# Patient Record
Sex: FEMALE | Race: WHITE | Employment: OTHER | URBAN - METROPOLITAN AREA
[De-identification: names, ages, dates, MRNs, and addresses within clinical notes are randomized per-mention and may not be internally consistent; named-entity substitution may affect disease eponyms.]

---

## 2018-01-26 ENCOUNTER — HOSPITAL ENCOUNTER (OUTPATIENT)
Dept: WOMENS IMAGING | Age: 78
Discharge: HOME | End: 2018-01-26
Attending: INTERNAL MEDICINE | Admitting: INTERNAL MEDICINE

## 2018-01-26 DIAGNOSIS — Z12.31 VISIT FOR SCREENING MAMMOGRAM: ICD-10-CM

## 2019-02-21 ENCOUNTER — HOSPITAL ENCOUNTER (OUTPATIENT)
Dept: WOMENS IMAGING | Age: 79
Discharge: HOME | End: 2019-02-21
Payer: MEDICARE

## 2019-02-21 DIAGNOSIS — Z12.39 BREAST CANCER SCREENING: ICD-10-CM

## 2019-02-21 PROCEDURE — 77063 BREAST TOMOSYNTHESIS BI: CPT

## 2020-02-25 ENCOUNTER — HOSPITAL ENCOUNTER (OUTPATIENT)
Dept: WOMENS IMAGING | Age: 80
Discharge: HOME | End: 2020-02-25
Payer: MEDICARE

## 2020-02-25 PROCEDURE — 77063 BREAST TOMOSYNTHESIS BI: CPT

## 2020-11-13 ENCOUNTER — HOSPITAL ENCOUNTER (INPATIENT)
Age: 80
LOS: 5 days | Discharge: HOME | DRG: 872 | End: 2020-11-18
Attending: EMERGENCY MEDICINE | Admitting: INTERNAL MEDICINE
Payer: MEDICARE

## 2020-11-13 ENCOUNTER — APPOINTMENT (OUTPATIENT)
Dept: GENERAL RADIOLOGY | Age: 80
DRG: 872 | End: 2020-11-13
Payer: MEDICARE

## 2020-11-13 ENCOUNTER — APPOINTMENT (OUTPATIENT)
Dept: CT IMAGING | Age: 80
DRG: 872 | End: 2020-11-13
Payer: MEDICARE

## 2020-11-13 PROBLEM — N39.0 COMPLICATED UTI (URINARY TRACT INFECTION): Status: ACTIVE | Noted: 2020-11-13

## 2020-11-13 LAB
A/G RATIO: 1.1 (ref 1.1–2.2)
ALBUMIN SERPL-MCNC: 3.4 G/DL (ref 3.4–5)
ALP BLD-CCNC: 111 U/L (ref 40–129)
ALT SERPL-CCNC: 16 U/L (ref 10–40)
ANION GAP SERPL CALCULATED.3IONS-SCNC: 13 MMOL/L (ref 3–16)
AST SERPL-CCNC: 27 U/L (ref 15–37)
BACTERIA: ABNORMAL /HPF
BASOPHILS ABSOLUTE: 0 K/UL (ref 0–0.2)
BASOPHILS RELATIVE PERCENT: 0.1 %
BILIRUB SERPL-MCNC: 0.6 MG/DL (ref 0–1)
BILIRUBIN URINE: NEGATIVE
BLOOD, URINE: ABNORMAL
BUN BLDV-MCNC: 12 MG/DL (ref 7–20)
CALCIUM SERPL-MCNC: 8.2 MG/DL (ref 8.3–10.6)
CHLORIDE BLD-SCNC: 97 MMOL/L (ref 99–110)
CLARITY: ABNORMAL
CO2: 24 MMOL/L (ref 21–32)
COLOR: YELLOW
CREAT SERPL-MCNC: 0.6 MG/DL (ref 0.6–1.2)
EOSINOPHILS ABSOLUTE: 0 K/UL (ref 0–0.6)
EOSINOPHILS RELATIVE PERCENT: 0.1 %
EPITHELIAL CELLS, UA: 0 /HPF (ref 0–5)
GFR AFRICAN AMERICAN: >60
GFR NON-AFRICAN AMERICAN: >60
GLOBULIN: 3.2 G/DL
GLUCOSE BLD-MCNC: 418 MG/DL (ref 70–99)
GLUCOSE URINE: >=1000 MG/DL
HCT VFR BLD CALC: 34.6 % (ref 36–48)
HEMOGLOBIN: 11.6 G/DL (ref 12–16)
HYALINE CASTS: 1 /LPF (ref 0–8)
KETONES, URINE: 40 MG/DL
LACTIC ACID: 1.4 MMOL/L (ref 0.4–2)
LEUKOCYTE ESTERASE, URINE: ABNORMAL
LIPASE: 10 U/L (ref 13–60)
LYMPHOCYTES ABSOLUTE: 0.3 K/UL (ref 1–5.1)
LYMPHOCYTES RELATIVE PERCENT: 2.1 %
MAGNESIUM: 1.7 MG/DL (ref 1.8–2.4)
MCH RBC QN AUTO: 30.6 PG (ref 26–34)
MCHC RBC AUTO-ENTMCNC: 33.5 G/DL (ref 31–36)
MCV RBC AUTO: 91.3 FL (ref 80–100)
MICROSCOPIC EXAMINATION: YES
MONOCYTES ABSOLUTE: 0.8 K/UL (ref 0–1.3)
MONOCYTES RELATIVE PERCENT: 6.5 %
NEUTROPHILS ABSOLUTE: 11.2 K/UL (ref 1.7–7.7)
NEUTROPHILS RELATIVE PERCENT: 91.2 %
NITRITE, URINE: POSITIVE
PDW BLD-RTO: 13 % (ref 12.4–15.4)
PH UA: 5.5 (ref 5–8)
PLATELET # BLD: 185 K/UL (ref 135–450)
PMV BLD AUTO: 8.4 FL (ref 5–10.5)
POTASSIUM REFLEX MAGNESIUM: 3.3 MMOL/L (ref 3.5–5.1)
PROCALCITONIN: 2.42 NG/ML (ref 0–0.15)
PROTEIN UA: 30 MG/DL
RBC # BLD: 3.79 M/UL (ref 4–5.2)
RBC UA: 1 /HPF (ref 0–4)
REASON FOR REJECTION: NORMAL
REJECTED TEST: NORMAL
SARS-COV-2, NAAT: NOT DETECTED
SODIUM BLD-SCNC: 134 MMOL/L (ref 136–145)
SPECIFIC GRAVITY UA: 1.02 (ref 1–1.03)
TOTAL PROTEIN: 6.6 G/DL (ref 6.4–8.2)
TROPONIN: <0.01 NG/ML
URINE REFLEX TO CULTURE: YES
URINE TYPE: ABNORMAL
UROBILINOGEN, URINE: 0.2 E.U./DL
WBC # BLD: 12.2 K/UL (ref 4–11)
WBC UA: 97 /HPF (ref 0–5)

## 2020-11-13 PROCEDURE — 6360000002 HC RX W HCPCS: Performed by: NURSE PRACTITIONER

## 2020-11-13 PROCEDURE — 2580000003 HC RX 258: Performed by: NURSE PRACTITIONER

## 2020-11-13 PROCEDURE — 96365 THER/PROPH/DIAG IV INF INIT: CPT

## 2020-11-13 PROCEDURE — 71045 X-RAY EXAM CHEST 1 VIEW: CPT

## 2020-11-13 PROCEDURE — 87086 URINE CULTURE/COLONY COUNT: CPT

## 2020-11-13 PROCEDURE — 96375 TX/PRO/DX INJ NEW DRUG ADDON: CPT

## 2020-11-13 PROCEDURE — 87150 DNA/RNA AMPLIFIED PROBE: CPT

## 2020-11-13 PROCEDURE — 1200000000 HC SEMI PRIVATE

## 2020-11-13 PROCEDURE — 93005 ELECTROCARDIOGRAM TRACING: CPT | Performed by: NURSE PRACTITIONER

## 2020-11-13 PROCEDURE — 87088 URINE BACTERIA CULTURE: CPT

## 2020-11-13 PROCEDURE — 81001 URINALYSIS AUTO W/SCOPE: CPT

## 2020-11-13 PROCEDURE — 96361 HYDRATE IV INFUSION ADD-ON: CPT

## 2020-11-13 PROCEDURE — 83735 ASSAY OF MAGNESIUM: CPT

## 2020-11-13 PROCEDURE — 83690 ASSAY OF LIPASE: CPT

## 2020-11-13 PROCEDURE — 87040 BLOOD CULTURE FOR BACTERIA: CPT

## 2020-11-13 PROCEDURE — 84484 ASSAY OF TROPONIN QUANT: CPT

## 2020-11-13 PROCEDURE — 84145 PROCALCITONIN (PCT): CPT

## 2020-11-13 PROCEDURE — 87186 SC STD MICRODIL/AGAR DIL: CPT

## 2020-11-13 PROCEDURE — 83605 ASSAY OF LACTIC ACID: CPT

## 2020-11-13 PROCEDURE — U0002 COVID-19 LAB TEST NON-CDC: HCPCS

## 2020-11-13 PROCEDURE — 87077 CULTURE AEROBIC IDENTIFY: CPT

## 2020-11-13 PROCEDURE — 99285 EMERGENCY DEPT VISIT HI MDM: CPT

## 2020-11-13 PROCEDURE — 85025 COMPLETE CBC W/AUTO DIFF WBC: CPT

## 2020-11-13 PROCEDURE — 6370000000 HC RX 637 (ALT 250 FOR IP): Performed by: NURSE PRACTITIONER

## 2020-11-13 PROCEDURE — 6360000004 HC RX CONTRAST MEDICATION: Performed by: NURSE PRACTITIONER

## 2020-11-13 PROCEDURE — 74177 CT ABD & PELVIS W/CONTRAST: CPT

## 2020-11-13 PROCEDURE — 80053 COMPREHEN METABOLIC PANEL: CPT

## 2020-11-13 RX ORDER — ACETAMINOPHEN 325 MG/1
650 TABLET ORAL ONCE
Status: COMPLETED | OUTPATIENT
Start: 2020-11-13 | End: 2020-11-13

## 2020-11-13 RX ORDER — ONDANSETRON 2 MG/ML
4 INJECTION INTRAMUSCULAR; INTRAVENOUS ONCE
Status: COMPLETED | OUTPATIENT
Start: 2020-11-13 | End: 2020-11-13

## 2020-11-13 RX ORDER — 0.9 % SODIUM CHLORIDE 0.9 %
500 INTRAVENOUS SOLUTION INTRAVENOUS ONCE
Status: COMPLETED | OUTPATIENT
Start: 2020-11-13 | End: 2020-11-13

## 2020-11-13 RX ORDER — POTASSIUM CHLORIDE 7.45 MG/ML
10 INJECTION INTRAVENOUS ONCE
Status: COMPLETED | OUTPATIENT
Start: 2020-11-13 | End: 2020-11-14

## 2020-11-13 RX ORDER — MAGNESIUM SULFATE IN WATER 40 MG/ML
2 INJECTION, SOLUTION INTRAVENOUS ONCE
Status: COMPLETED | OUTPATIENT
Start: 2020-11-13 | End: 2020-11-13

## 2020-11-13 RX ADMIN — SODIUM CHLORIDE 500 ML: 9 INJECTION, SOLUTION INTRAVENOUS at 20:24

## 2020-11-13 RX ADMIN — ONDANSETRON 4 MG: 2 INJECTION INTRAMUSCULAR; INTRAVENOUS at 20:24

## 2020-11-13 RX ADMIN — MAGNESIUM SULFATE HEPTAHYDRATE 2 G: 40 INJECTION, SOLUTION INTRAVENOUS at 21:40

## 2020-11-13 RX ADMIN — CEFTRIAXONE 1 G: 1 INJECTION, POWDER, FOR SOLUTION INTRAMUSCULAR; INTRAVENOUS at 23:31

## 2020-11-13 RX ADMIN — ACETAMINOPHEN 650 MG: 325 TABLET ORAL at 20:25

## 2020-11-13 RX ADMIN — IOPAMIDOL 75 ML: 755 INJECTION, SOLUTION INTRAVENOUS at 20:48

## 2020-11-13 ASSESSMENT — ENCOUNTER SYMPTOMS
DIARRHEA: 0
COLOR CHANGE: 0
VOMITING: 1
SHORTNESS OF BREATH: 0
BLOOD IN STOOL: 0
ABDOMINAL PAIN: 1
COUGH: 0
CONSTIPATION: 0
BACK PAIN: 1
NAUSEA: 1

## 2020-11-13 ASSESSMENT — PAIN SCALES - GENERAL: PAINLEVEL_OUTOF10: 0

## 2020-11-14 LAB
BASOPHILS ABSOLUTE: 0 K/UL (ref 0–0.2)
BASOPHILS RELATIVE PERCENT: 0.1 %
EKG ATRIAL RATE: 90 BPM
EKG DIAGNOSIS: NORMAL
EKG P AXIS: -4 DEGREES
EKG P-R INTERVAL: 158 MS
EKG Q-T INTERVAL: 428 MS
EKG QRS DURATION: 98 MS
EKG QTC CALCULATION (BAZETT): 523 MS
EKG R AXIS: -40 DEGREES
EKG T AXIS: 43 DEGREES
EKG VENTRICULAR RATE: 90 BPM
EOSINOPHILS ABSOLUTE: 0 K/UL (ref 0–0.6)
EOSINOPHILS RELATIVE PERCENT: 0 %
HCT VFR BLD CALC: 37 % (ref 36–48)
HEMOGLOBIN: 12 G/DL (ref 12–16)
LYMPHOCYTES ABSOLUTE: 0.6 K/UL (ref 1–5.1)
LYMPHOCYTES RELATIVE PERCENT: 4.5 %
MCH RBC QN AUTO: 30 PG (ref 26–34)
MCHC RBC AUTO-ENTMCNC: 32.4 G/DL (ref 31–36)
MCV RBC AUTO: 92.7 FL (ref 80–100)
MONOCYTES ABSOLUTE: 0.6 K/UL (ref 0–1.3)
MONOCYTES RELATIVE PERCENT: 4.4 %
NEUTROPHILS ABSOLUTE: 13.1 K/UL (ref 1.7–7.7)
NEUTROPHILS RELATIVE PERCENT: 91 %
PDW BLD-RTO: 12.9 % (ref 12.4–15.4)
PLATELET # BLD: 209 K/UL (ref 135–450)
PMV BLD AUTO: 9 FL (ref 5–10.5)
RBC # BLD: 3.99 M/UL (ref 4–5.2)
REPORT: NORMAL
WBC # BLD: 14.3 K/UL (ref 4–11)

## 2020-11-14 PROCEDURE — 1200000000 HC SEMI PRIVATE

## 2020-11-14 PROCEDURE — 2580000003 HC RX 258: Performed by: INTERNAL MEDICINE

## 2020-11-14 PROCEDURE — 6360000002 HC RX W HCPCS: Performed by: INTERNAL MEDICINE

## 2020-11-14 PROCEDURE — 85025 COMPLETE CBC W/AUTO DIFF WBC: CPT

## 2020-11-14 PROCEDURE — 93010 ELECTROCARDIOGRAM REPORT: CPT | Performed by: INTERNAL MEDICINE

## 2020-11-14 PROCEDURE — 6360000002 HC RX W HCPCS: Performed by: NURSE PRACTITIONER

## 2020-11-14 PROCEDURE — 94760 N-INVAS EAR/PLS OXIMETRY 1: CPT

## 2020-11-14 PROCEDURE — 6370000000 HC RX 637 (ALT 250 FOR IP): Performed by: INTERNAL MEDICINE

## 2020-11-14 PROCEDURE — 36415 COLL VENOUS BLD VENIPUNCTURE: CPT

## 2020-11-14 RX ORDER — CALCIUM CARBONATE 500(1250)
950 TABLET ORAL DAILY
Status: DISCONTINUED | OUTPATIENT
Start: 2020-11-14 | End: 2020-11-18 | Stop reason: HOSPADM

## 2020-11-14 RX ORDER — METOPROLOL SUCCINATE 50 MG/1
100 TABLET, EXTENDED RELEASE ORAL 2 TIMES DAILY
Status: DISCONTINUED | OUTPATIENT
Start: 2020-11-14 | End: 2020-11-14

## 2020-11-14 RX ORDER — ACETAMINOPHEN 650 MG/1
650 SUPPOSITORY RECTAL EVERY 6 HOURS PRN
Status: DISCONTINUED | OUTPATIENT
Start: 2020-11-14 | End: 2020-11-18 | Stop reason: HOSPADM

## 2020-11-14 RX ORDER — LEVOTHYROXINE SODIUM 0.1 MG/1
100 TABLET ORAL DAILY
Status: DISCONTINUED | OUTPATIENT
Start: 2020-11-14 | End: 2020-11-18 | Stop reason: HOSPADM

## 2020-11-14 RX ORDER — SODIUM CHLORIDE 0.9 % (FLUSH) 0.9 %
10 SYRINGE (ML) INJECTION PRN
Status: DISCONTINUED | OUTPATIENT
Start: 2020-11-14 | End: 2020-11-18 | Stop reason: HOSPADM

## 2020-11-14 RX ORDER — METOPROLOL TARTRATE 100 MG/1
100 TABLET ORAL 2 TIMES DAILY
COMMUNITY

## 2020-11-14 RX ORDER — AMLODIPINE BESYLATE 5 MG/1
2.5 TABLET ORAL NIGHTLY
Status: DISCONTINUED | OUTPATIENT
Start: 2020-11-14 | End: 2020-11-14

## 2020-11-14 RX ORDER — PROMETHAZINE HYDROCHLORIDE 25 MG/1
12.5 TABLET ORAL EVERY 6 HOURS PRN
Status: DISCONTINUED | OUTPATIENT
Start: 2020-11-14 | End: 2020-11-18 | Stop reason: HOSPADM

## 2020-11-14 RX ORDER — CANDESARTAN CILEXETIL AND HYDROCHLOROTHIAZIDE 32; 12.5 MG/1; MG/1
1 TABLET ORAL DAILY
COMMUNITY

## 2020-11-14 RX ORDER — ONDANSETRON 2 MG/ML
4 INJECTION INTRAMUSCULAR; INTRAVENOUS EVERY 6 HOURS PRN
Status: DISCONTINUED | OUTPATIENT
Start: 2020-11-14 | End: 2020-11-18 | Stop reason: HOSPADM

## 2020-11-14 RX ORDER — ACETAMINOPHEN 325 MG/1
650 TABLET ORAL EVERY 6 HOURS PRN
Status: DISCONTINUED | OUTPATIENT
Start: 2020-11-14 | End: 2020-11-18 | Stop reason: HOSPADM

## 2020-11-14 RX ORDER — ATORVASTATIN CALCIUM 20 MG/1
20 TABLET, FILM COATED ORAL NIGHTLY
Status: DISCONTINUED | OUTPATIENT
Start: 2020-11-14 | End: 2020-11-18 | Stop reason: HOSPADM

## 2020-11-14 RX ORDER — METOPROLOL TARTRATE 50 MG/1
100 TABLET, FILM COATED ORAL 2 TIMES DAILY
Status: DISCONTINUED | OUTPATIENT
Start: 2020-11-14 | End: 2020-11-18 | Stop reason: HOSPADM

## 2020-11-14 RX ORDER — AMLODIPINE BESYLATE 10 MG/1
10 TABLET ORAL DAILY
COMMUNITY

## 2020-11-14 RX ORDER — SODIUM CHLORIDE, SODIUM LACTATE, POTASSIUM CHLORIDE, CALCIUM CHLORIDE 600; 310; 30; 20 MG/100ML; MG/100ML; MG/100ML; MG/100ML
INJECTION, SOLUTION INTRAVENOUS CONTINUOUS
Status: DISCONTINUED | OUTPATIENT
Start: 2020-11-14 | End: 2020-11-15

## 2020-11-14 RX ORDER — SODIUM CHLORIDE 0.9 % (FLUSH) 0.9 %
10 SYRINGE (ML) INJECTION EVERY 12 HOURS SCHEDULED
Status: DISCONTINUED | OUTPATIENT
Start: 2020-11-14 | End: 2020-11-18 | Stop reason: HOSPADM

## 2020-11-14 RX ORDER — ASPIRIN 81 MG/1
81 TABLET ORAL DAILY
Status: DISCONTINUED | OUTPATIENT
Start: 2020-11-14 | End: 2020-11-14

## 2020-11-14 RX ADMIN — LEVOTHYROXINE SODIUM 100 MCG: 0.1 TABLET ORAL at 05:33

## 2020-11-14 RX ADMIN — SODIUM CHLORIDE, POTASSIUM CHLORIDE, SODIUM LACTATE AND CALCIUM CHLORIDE: 600; 310; 30; 20 INJECTION, SOLUTION INTRAVENOUS at 02:17

## 2020-11-14 RX ADMIN — METOPROLOL TARTRATE 100 MG: 50 TABLET, FILM COATED ORAL at 21:23

## 2020-11-14 RX ADMIN — Medication 10 MEQ: at 00:30

## 2020-11-14 RX ADMIN — ENOXAPARIN SODIUM 40 MG: 40 INJECTION SUBCUTANEOUS at 09:48

## 2020-11-14 RX ADMIN — Medication 10 ML: at 19:36

## 2020-11-14 RX ADMIN — ACETAMINOPHEN 650 MG: 325 TABLET ORAL at 15:46

## 2020-11-14 RX ADMIN — CEFTRIAXONE 2 G: 2 INJECTION, POWDER, FOR SOLUTION INTRAMUSCULAR; INTRAVENOUS at 15:11

## 2020-11-14 RX ADMIN — CALCIUM 1000 MG: 500 TABLET ORAL at 09:48

## 2020-11-14 RX ADMIN — METOPROLOL TARTRATE 100 MG: 50 TABLET, FILM COATED ORAL at 09:48

## 2020-11-14 RX ADMIN — ATORVASTATIN CALCIUM 20 MG: 20 TABLET, FILM COATED ORAL at 21:23

## 2020-11-14 RX ADMIN — SODIUM CHLORIDE, POTASSIUM CHLORIDE, SODIUM LACTATE AND CALCIUM CHLORIDE: 600; 310; 30; 20 INJECTION, SOLUTION INTRAVENOUS at 14:03

## 2020-11-14 ASSESSMENT — PAIN SCALES - GENERAL
PAINLEVEL_OUTOF10: 0
PAINLEVEL_OUTOF10: 5

## 2020-11-14 ASSESSMENT — PAIN DESCRIPTION - DESCRIPTORS
DESCRIPTORS: ACHING
DESCRIPTORS: ACHING

## 2020-11-14 ASSESSMENT — PAIN DESCRIPTION - FREQUENCY
FREQUENCY: INTERMITTENT
FREQUENCY: INTERMITTENT

## 2020-11-14 ASSESSMENT — ENCOUNTER SYMPTOMS
SHORTNESS OF BREATH: 0
EYE ITCHING: 0
EYE DISCHARGE: 0
VOMITING: 1
COLOR CHANGE: 0
NAUSEA: 1
CHEST TIGHTNESS: 0
ABDOMINAL PAIN: 1

## 2020-11-14 ASSESSMENT — PAIN DESCRIPTION - ONSET
ONSET: PROGRESSIVE
ONSET: PROGRESSIVE

## 2020-11-14 ASSESSMENT — PAIN - FUNCTIONAL ASSESSMENT
PAIN_FUNCTIONAL_ASSESSMENT: PREVENTS OR INTERFERES SOME ACTIVE ACTIVITIES AND ADLS
PAIN_FUNCTIONAL_ASSESSMENT: PREVENTS OR INTERFERES SOME ACTIVE ACTIVITIES AND ADLS

## 2020-11-14 ASSESSMENT — PAIN DESCRIPTION - LOCATION
LOCATION: ABDOMEN
LOCATION: ABDOMEN

## 2020-11-14 ASSESSMENT — PAIN DESCRIPTION - PROGRESSION
CLINICAL_PROGRESSION: GRADUALLY WORSENING
CLINICAL_PROGRESSION: GRADUALLY IMPROVING

## 2020-11-14 ASSESSMENT — PAIN DESCRIPTION - PAIN TYPE
TYPE: ACUTE PAIN
TYPE: ACUTE PAIN

## 2020-11-14 ASSESSMENT — PAIN DESCRIPTION - ORIENTATION
ORIENTATION: MID;LOWER
ORIENTATION: MID;LOWER

## 2020-11-14 NOTE — ED NOTES
Donna Dillon, clinical supervisor notified regarding patient family concern about patient wandering around hospital at night d/t her confusion.  Per Amanda Dueñas, patient daughter, patient was allowed to have  at her bedside at her last visit d/t patient confusion and attempting to wander around hospital.      Aaron Pittman RN  11/13/20 8121

## 2020-11-14 NOTE — PROGRESS NOTES
Comprehensive Nutrition Assessment    Type and Reason for Visit:  Initial, Positive Nutrition Screen(diarrhea)    Nutrition Recommendations/Plan:   Add Ensure Enlive bid to start  Attempt to f/u with pt for interview    Nutrition Assessment:  Pt unavailable during screen, so information taken from medical record. Pt with pmh of mild dementia, HLD, HTN, new dx of cecum cancer, adm r/t 1 day of not feeling well & worsened mental status. Found to have complicated UTI. Noted no issues with diarrhea since adm. Diet adv to general. Noted per records that wt with no significant change, so po intake likely stable. With new dx of cancer, will add Ensure Enlive bid to start. Malnutrition Assessment:  Malnutrition Status:  Insufficient data    Context:  Acute Illness       Estimated Daily Nutrient Needs:  Energy (kcal):  7737-8205 (22-28 x ABW 63 kg); Weight Used for Energy Requirements:        Protein (g):  76 (1.2 x ABW); Weight Used for Protein Requirements:           Fluid (ml/day):  1 ml per kcal; Method Used for Fluid Requirements:         Nutrition Related Findings:  Noted no edema. Wounds:  None       Current Nutrition Therapies:    DIET GENERAL; Anthropometric Measures:  · Height: 5' (152.4 cm)  · Current Body Weight: 138 lb (62.6 kg)   · Admission Body Weight: 138 lb (62.6 kg)    · Ideal Body Weight: 100 lbs; % Ideal Body Weight 138 %   · BMI: 27  · Adjusted Body Weight:  ; No Adjustment   · BMI Categories: Overweight (BMI 25.0-29. 9)       Nutrition Diagnosis:   Predicted inadequate energy intake r/t catabolic illness AEB new dx of cancer    Nutrition Interventions:   Food and/or Nutrient Delivery:  Continue Current Diet, Start Oral Nutrition Supplement  Nutrition Education/Counseling:  No recommendation at this time   Coordination of Nutrition Care:  Continue to monitor while inpatient    Goals:  consume >/= to 50 %       Nutrition Monitoring and Evaluation:   Behavioral-Environmental Outcomes:  None Identified   Food/Nutrient Intake Outcomes:  Food and Nutrient Intake, Supplement Intake  Physical Signs/Symptoms Outcomes:  Biochemical Data, Constipation, Diarrhea, Nausea or Vomiting, Weight, Skin, Nutrition Focused Physical Findings, Fluid Status or Edema     Discharge Planning:     Too soon to determine     Electronically signed by Danice Ormond, RD, LD on 11/14/20 at 11:54 AM EST    Contact: [phone]

## 2020-11-14 NOTE — PROGRESS NOTES
Perfect serve to Dr Julianna Jenkins: please DC 81 mg aspirin. she does not take this at home.  refused to take this am. thanks

## 2020-11-14 NOTE — PROGRESS NOTES
Physician Progress Note      Darius Babcock  CSN #:                  [id number]  :                       1940  ADMIT DATE:       2020 6:49 PM  [address] [place] DATE:  RESPONDING  PROVIDER #:        Joshua Olson MD          QUERY TEXT:    Dear Dr Anand Ames,  Pt admitted with sepsis. Pt noted to have ams\"sundowning\". If possible, please   document in the progress notes and discharge summary if you are evaluating   and / or treating any of the following: The medical record reflects the following:  Risk Factors: age, uti, sepsis  Clinical Indicators: Documentation per ED of  Alert to person and place but   not year or president. Patient seems very confused when answering questions,   and cannot end up answering most of them. She will attempt to answer some   questions but seems very confused. Per h&p noted- Apparently, per the ER   documentation, the patient also had some underlying confusion, though at  this point in time, after treatment, the patient does not seem to be confused. Documentation per nursing of A&O and telly camera in place for sundowning   issues (according to family)  Treatment: monitor mental status, reorient to environment, telecamera    Thank you, Chioma Magana RN CDS CRCR  [email or web address]  [phone]  Options provided:  -- Septic encephalopathy  -- Delirium  -- Other - I will add my own diagnosis  -- Disagree - Not applicable / Not valid  -- Disagree - Clinically unable to determine / Unknown  -- Refer to Clinical Documentation Reviewer    PROVIDER RESPONSE TEXT:    Patient has delirium with unknown cause. Query created by:  Stew Magana on 2020 1:09 PM      Electronically signed by:  Joshua Olson MD 2020 4:14 PM

## 2020-11-14 NOTE — H&P
[address] [place] [address]                              HISTORY AND PHYSICAL    PATIENT NAME: Dorinda Rodas                  :        1940  MED REC NO:   [id number]                          ROOM:       3034  ACCOUNT NO:   [de-identified]                           ADMIT DATE: 2020  PROVIDER:     Macario Griffin MD    I obtained the history and performed the physical exam on the patient in  the Emergency Room on 2020. CHIEF COMPLAINT:  Fevers and chills. HISTORY OF PRESENT ILLNESS:  The patient is an 73-year-old   female who presented to the hospital with chief complaint of 1-day  history of sudden onset of gradually progressive increasing episodes of  what she describes as significant fevers, chills, fatigue, back pain  with some nausea, without vomiting, and just feeling progressively more  and more run down to a point where she was concerned, told her ,  and her  got her to the hospital because something was not right. No other constitutional symptoms. At the time of presentation, the  patient also started having some vomiting. Apparently, per the ER  documentation, the patient also had some underlying confusion, though at  this point in time, after treatment, the patient does not seem to be  confused. PAST MEDICAL/PAST SURGICAL HISTORY:  1. Hypothyroidism. 2.  Dyslipidemia. 3.  Hypertension. ALLERGIC HISTORY:  No known drug allergies. FAMILY HISTORY:  Reviewed by me and is currently noncontributory. SOCIAL HISTORY:  Nonsmoker. No illicit substance use. MEDICATIONS:  The patient's home medication list reviewed by me and  listed in the EMR. REVIEW OF SYSTEMS:  The patient's review of systems is significant for  the fevers and chills and per the history of present illness.   All other  systems have been reviewed and are negative except for the history of  present illness. PHYSICAL EXAMINATION:  The patient was examined by me in the Emergency  Room. VITAL SIGNS:  Temperature is T-max 102.4, respiratory rate 19, pulse 91,  blood pressure 133/66, saturating 92%. CNS:  Alert, awake, and oriented currently. PSYCH:  Cooperative, pleasant, answering questions appropriately. HEENT:  Eyes:  Pupils are reactive to light. ENT:  Extraocular muscle  movements are intact. RESPIRATORY:  No obvious rales or externally audible wheezes or rhonchi. CARDIOVASCULAR:  Nontachycardic. ABDOMEN:  Nondistended. The patient does have some vague abdominal  tenderness to palpation. MUSCULOSKELETAL:  No acute deformities. SKIN:  Without rashes or lesions. DIAGNOSTIC DATA:  The patient has a chest x-ray now showing no acute  cardiopulmonary disease. BUN 12, creatinine 0.6, sodium 134, potassium 3.3, magnesium 1.7. Procalcitonin 2.42. CBC showed a white count of 12.2 with a hemoglobin  of 11.6.  UA showed positive nitrite, moderate leukocyte esterase. COVID-19 testing negative. UA also showed 4+ bacteria, 97 white cells. CT of the abdomen and pelvis shows urothelial enhancement of bilateral  renal collecting systems and ureters. CONSULTATIONS CURRENTLY REQUESTED:  None. ASSESSMENT:  1. Complicated gram-negative bacterial urinary tract infection with  systemic inflammatory response syndrome. 2.  Hypertension. 3.  Hypothyroidism. 4.  Dyslipidemia. PLAN OF CARE:  The patient is admitted to Internal Medicine service. The patient will be continued on IV ceftriaxone. The patient's home  dose of Lipitor, Synthroid, and aspirin will be continued. The  patient's home dose of Toprol-XL will be continued as well. IV hydration will be initiated with lactated Ringer's. DVT prophylaxis with Lovenox. CODE STATUS:  Full. EXPECTED LENGTH OF STAY:  More than two midnights based on the plan of  care above.     RISK:  High due to the patient's presentation with the gram-negative  bacterial urinary tract infection with SIRS and early sepsis. DISPOSITION:  Admitted to Internal Medicine service.         Adilia Amor MD    D: 11/14/2020 5:39:45       T: 11/14/2020 6:45:15     [email or web address]  Job#: [id number]     Doc#: [id number]    CC:

## 2020-11-14 NOTE — PROGRESS NOTES
Pharmacy Medication Reconciliation Note     List of medications patient is currently taking is complete. Source of information:   1. Patient's  with medication list  2. EMR    Notes regarding home medications:   1. Patient's medication list clarified. Added amlodipine back and updated dose, removed losartan-HCTZ and added candesartan-HCTZ, metoprolol dose corrected to metoprolol tartrate BID. Patient is not taking aspirin at home.       [address] [facility] [place], Pharmacy Intern  11/14/2020 11:20 AM

## 2020-11-14 NOTE — ED NOTES
Report to [facility], [facility].       Osmany Schwartz RN  11/13/20 4147

## 2020-11-14 NOTE — PLAN OF CARE
Nutrition Problem #1: Predicted inadequate energy intake  Intervention: Food and/or Nutrient Delivery: Continue Current Diet, Start Oral Nutrition Supplement  Nutritional Goals: consume >/= to 50 %

## 2020-11-14 NOTE — PROGRESS NOTES
Progress Note    Admit Date: 11/13/2020         Subjective and Overnight Events: Pt being followed up for UTI  Feels better still weak and tired  No fever  No vomit       Objective:   Vitals: /62   Pulse 80   Temp 98.6 °F (37 °C) (Oral)   Resp 16   Ht 5' (1.524 m)   Wt 138 lb (62.6 kg)   SpO2 97%   BMI 26.95 kg/m²   /62   Pulse 80   Temp 98.6 °F (37 °C) (Oral)   Resp 16   Ht 5' (1.524 m)   Wt 138 lb (62.6 kg)   SpO2 97%   BMI 26.95 kg/m²     General Appearance:    Alert, cooperative, no distress, appears stated age   Head:    Normocephalic, without obvious abnormality, atraumatic   Eyes:    PERRL, conjunctiva/corneas clear       Ears:    Normal TM's and external ear canals, both ears   Nose:   Nares normal, septum midline, mucosa normal   Throat:   Lips, mucosa, and tongue normal; teeth and gums normal           Lungs:     Clear to auscultation bilaterally, respirations unlabored       Heart:    Regular rate and rhythm, S1 and S2 normal, no murmur, rub   or gallop   Abdomen:     Soft, non-tender, bowel sounds active all four quadrants,     no masses, no organomegaly           Extremities:   Extremities normal, atraumatic, no cyanosis or edema   Pulses:   2+ and symmetric all extremities   Skin:   Skin color, texture, turgor normal, no rashes or lesions       Neurologic:   CNII-XII intact.  Normal strength, sensation and reflexes       throughout         Pain is:Mild  Nausea:Mild  Bowel Movement/Flatus yes    Data:     Scheduled Medications:    aspirin  81 mg Oral Daily    calcium elemental  1,000 mg Oral Daily    levothyroxine  100 mcg Oral Daily    atorvastatin  20 mg Oral Nightly    sodium chloride flush  10 mL Intravenous 2 times per day    enoxaparin  40 mg Subcutaneous Daily    metoprolol tartrate  100 mg Oral BID    cefTRIAXone (ROCEPHIN) IV  2 g Intravenous Q24H      PRN Medications: sodium chloride flush, acetaminophen **OR** acetaminophen, promethazine **OR** ondansetron  Diet: DIET GENERAL;    Continuous Infusions:   lactated ringers 125 mL/hr at 11/14/20 0217         Intake/Output Summary (Last 24 hours) at 11/14/2020 0910  Last data filed at 11/14/2020 0454  Gross per 24 hour   Intake --   Output 120 ml   Net -120 ml       CBC:   Recent Labs     11/13/20  1950 11/14/20  0213   WBC 12.2* 14.3*   HGB 11.6* 12.0    209     BMP:  Recent Labs     11/13/20 1950   *   K 3.3*   CL 97*   CO2 24   BUN 12   CREATININE 0.6   GLUCOSE 418*     ABGs: No results found for: PHART, PO2ART, NHY8HAZ    Assessment/plan     Patient Active Problem List:     Incarcerated inguinal hernia     Diverticulitis     Complicated UTI (urinary tract infection)    - sepsis present on admission T + WBC + source - UTI  - Complicated gram-negative bacterial urinary tract infection with  systemic inflammatory response syndrome. - Hypertension.  - Hypothyroidism.  - Dyslipidemia.     Plan  BBx + for Ecoli + cefepime 2 grams   Continue hydration     Full Joyce Tristan MD

## 2020-11-14 NOTE — ED PROVIDER NOTES
[address] [facility]      Pt Name: Luis A Chiang  MRN: [id number]  [place] 1940  Date of evaluation: 11/13/2020  Provider: Romel Fernández MD    CHIEF COMPLAINT       Chief Complaint   Patient presents with    Emesis     pt brought to ED via EMS from home for sudden onset of n/v started tonight. HISTORY OF PRESENT ILLNESS    Luis A Chiang is a 78 y.o. female who presents to the emergency department with N/V/abdominal pain. Started today. + for confusion as well. Pain is 2/10 crampy in nature. Never happened before. No other associated symptoms. Nursing Notes were reviewed. Including nursing noted for FM, Surgical History, Past Medical History, Social History, vitals, and allergies; agree with all. REVIEW OF SYSTEMS       Review of Systems   Constitutional: Positive for activity change and fatigue. HENT: Negative for congestion and dental problem. Eyes: Negative for discharge and itching. Respiratory: Negative for chest tightness and shortness of breath. Gastrointestinal: Positive for abdominal pain, nausea and vomiting. Endocrine: Negative for cold intolerance and heat intolerance. Genitourinary: Positive for difficulty urinating. Negative for dysuria. Musculoskeletal: Positive for myalgias. Skin: Negative for color change and pallor. Neurological: Negative for dizziness and facial asymmetry. Hematological: Negative for adenopathy. Does not bruise/bleed easily. Psychiatric/Behavioral: Negative for agitation. Except as noted above the remainder of the review of systems was reviewed and negative.      PAST MEDICAL HISTORY     Past Medical History:   Diagnosis Date    Hyperlipidemia     Hypertension     Incarcerated hernia     Thyroid disease     hypothyroid    Wears partial dentures     upper       SURGICAL HISTORY       Past Surgical History:   Procedure Laterality Date    HARDWARE REMOVAL Right right leg    INGUINAL HERNIA REPAIR Left 1-22-14    Left incarcerated inguinal hernia repair with mesh and exparel     LEG SURGERY Right     alexis placement    SHOULDER SURGERY Left 2008    rotator cuff repair       CURRENT MEDICATIONS       Previous Medications    ALENDRONATE SODIUM (FOSAMAX PO)    Take 70 mg by mouth once a week. sundays    CHOLECALCIFEROL (VITAMIN D-3 PO)    Take 3,000 Units by mouth daily. Takes 3tabs of 1000iu daily to = 3000 units    LACTOBACILLUS RHAMNOSUS, GG, (CULTURELLE PO)    Take 1 capsule by mouth daily Take one capsule by mouth daily    LEVOTHYROXINE (SYNTHROID) 100 MCG TABLET    Take 100 mcg by mouth Daily. LOSARTAN-HYDROCHLOROTHIAZIDE (HYZAAR) 50-12.5 MG PER TABLET    Take 1 tablet by mouth daily. METOPROLOL (TOPROL-XL) 100 MG XL TABLET    Take 100 mg by mouth 2 times daily. SIMVASTATIN (ZOCOR) 40 MG TABLET    Take 40 mg by mouth nightly. ALLERGIES     Patient has no known allergies. FAMILY HISTORY      History reviewed. No pertinent family history.     SOCIAL HISTORY       Social History     Socioeconomic History    Marital status:      Spouse name: None    Number of children: None    Years of education: None    Highest education level: None   Occupational History    None   Social Needs    Financial resource strain: None    Food insecurity     Worry: None     Inability: None    Transportation needs     Medical: None     Non-medical: None   Tobacco Use    Smoking status: Never Smoker    Smokeless tobacco: Never Used   Substance and Sexual Activity    Alcohol use: Not Currently     Comment: rarely    Drug use: No    Sexual activity: None   Lifestyle    Physical activity     Days per week: None     Minutes per session: None    Stress: None   Relationships    Social connections     Talks on phone: None     Gets together: None     Attends Baptism service: None     Active member of club or organization: None     Attends meetings of clubs or organizations: None     Relationship status: None    Intimate partner violence     Fear of current or ex partner: None     Emotionally abused: None     Physically abused: None     Forced sexual activity: None   Other Topics Concern    None   Social History Narrative    None       PHYSICAL EXAM       ED Triage Vitals [11/13/20 1852]   BP Temp Temp Source Pulse Resp SpO2 Height Weight   133/66 102.4 °F (39.1 °C) Oral 91 19 92 % -- 138 lb 7.2 oz (62.8 kg)       Physical Exam  Vitals signs and nursing note reviewed. Constitutional:       General: She is not in acute distress. Appearance: She is well-developed. She is ill-appearing. She is not toxic-appearing or diaphoretic. HENT:      Head: Normocephalic and atraumatic. Right Ear: External ear normal.      Left Ear: External ear normal.      Mouth/Throat:      Mouth: Mucous membranes are dry. Eyes:      General:         Right eye: No discharge. Left eye: No discharge. Conjunctiva/sclera: Conjunctivae normal.      Pupils: Pupils are equal, round, and reactive to light. Neck:      Musculoskeletal: Normal range of motion and neck supple. Cardiovascular:      Rate and Rhythm: Normal rate and regular rhythm. Heart sounds: No murmur. Pulmonary:      Effort: Pulmonary effort is normal. No respiratory distress. Breath sounds: Normal breath sounds. No wheezing or rales. Abdominal:      General: Bowel sounds are normal. There is no distension. Palpations: Abdomen is soft. There is no mass. Tenderness: There is abdominal tenderness. There is no guarding or rebound. Genitourinary:     Comments: Deferred  Musculoskeletal: Normal range of motion. General: No deformity. Skin:     General: Skin is warm. Findings: No erythema or rash. Neurological:      Mental Status: She is alert. She is not disoriented. Cranial Nerves: No cranial nerve deficit. Sensory: No sensory deficit.       Motor: No atrophy or Ketones, Urine 40 (*)     Blood, Urine SMALL (*)     Protein, UA 30 (*)     Nitrite, Urine POSITIVE (*)     Leukocyte Esterase, Urine MODERATE (*)     All other components within normal limits    Narrative:     Performed at:  [facility]  [address] [facility] [place]   Phone [phone]   PROCALCITONIN - Abnormal; Notable for the following components:    Procalcitonin 2.42 (*)     All other components within normal limits    Narrative:     Lenny Morrisdilip tel. [phone],  Rejected Test Name/Called to: Anni Rodriguez, 11/13/2020 20:46, by [facility]  Performed at:  [facility]  [address] [facility] [address] [address]   Phone [phone]   MAGNESIUM - Abnormal; Notable for the following components:    Magnesium 1.70 (*)     All other components within normal limits    Narrative:     Performed at:  [address] [address]   Phone [phone]   CBC WITH AUTO DIFFERENTIAL - Abnormal; Notable for the following components:    WBC 12.2 (*)     RBC 3.79 (*)     Hemoglobin 11.6 (*)     Hematocrit 34.6 (*)     Neutrophils Absolute 11.2 (*)     Lymphocytes Absolute 0.3 (*)     All other components within normal limits    Narrative:     Lenny Modestooscar tel. [phone],  Rejected Test Name/Called to: Anni Rodriguez, 11/13/2020 20:46, by [facility]  Performed at:  [facility]  [address] [address] [address]   Phone [phone]   MICROSCOPIC URINALYSIS - Abnormal; Notable for the following components:    Bacteria, UA 4+ (*)     WBC, UA 97 (*)     All other components within normal limits    Narrative:     Performed at:  [address] [facility] [place]   Phone [phone]   CULTURE, BLOOD 1   CULTURE, BLOOD 2   CULTURE, URINE   TROPONIN    Narrative:     CALL  [facility] tel. [phone],  Rejected Test Name/Called to: Nicolette Kang, 11/13/2020 20:46, by [facility]  Performed at:  [facility]  [address] [facility] [place] [address]   Phone [phone]   LACTIC ACID, PLASMA    Narrative:     Rudi Cornelius tel. [phone],  Rejected Test Name/Called to: Nicolette Kang, 11/13/2020 20:46, by [facility]  Performed at:  [facility]  [address], [facility] [place] [address]   Phone [phone]    Narrative:     Rudi Cornelius tel. [phone],  Rejected Test Name/Called to: Nicolette Kang, 11/13/2020 20:46, by [facility]  Performed at:  [facility]  [address] [facility] [place] [address]   Phone [phone]    Narrative:     Performed at:  [facility]  [address] [facility] [address]   Phone [phone]   CBC WITH AUTO DIFFERENTIAL       All other labs were withinnormal range or not returned as of this dictation. EMERGENCY DEPARTMENT COURSE and DIFFERENTIAL DIAGNOSIS/MDM:     PMH, Surgical Hx, FH, Social Hx reviewed by myself (ETOH usage, Tobacco usage, Drug usage reviewed by myself, no pertinent Hx)- No Pertinent Hx     Old records were reviewed by me    Sepsis and UTI- Fluids and abx. Possible diverticulitis. Aaron repleted. Admission for further work up. Dr Walter Roa consulted and to admit. CRITICAL CARE TIME   Total Critical Caretime was 39 minutes, excluding separately reportable procedures. There was a high probability of clinically significant/life threatening deterioration in the patient's condition which required my urgent intervention. PROCEDURES:  Unlessotherwise noted below, none    FINAL IMPRESSION      1. Septicemia ([address]    2. Urinary tract infection without hematuria, site unspecified    3. Acute diverticulitis    4. Cecum mass    5.  Altered mental status, unspecified altered mental status type    6. Elevated procalcitonin    7. Non-intractable vomiting with nausea, unspecified vomiting type    8. Dehydration    9. Hypokalemia    10. Hypomagnesemia    11.  Hyperglycemia          DISPOSITION/PLAN   DISPOSITION Admitted 11/13/2020 10:35:22 PM    (Please note that portions ofthis note were completed with a voice recognition program.  Efforts were made to edit the dictations but occasionally words are mis-transcribed.)    Romel Fernández MD(electronically signed)  Attending Emergency Physician        Romel Fernández MD  11/14/20 8928

## 2020-11-14 NOTE — PROGRESS NOTES
Iv fluids infusing as ordered. Fall risk assessment completed. Fall precautions in place. Call light within reach. Pt educated on calling for assistance before getting up. Pt assessed for pain. Pt denies any pain at this time. Will continue to monitor pt and assess for pain throughout rest of shift. Walkway free of clutter. Will continue to monitor.    Electronically signed by Nandini Villa RN on 11/14/2020 at 5:52 AM

## 2020-11-14 NOTE — PROGRESS NOTES
Medication Reconciliation  Went bedside and spoke to patient and spouse about updated medications.     [address] Intern

## 2020-11-14 NOTE — PROGRESS NOTES
Patient in bed, she is alert and oriented x4 at this time. She has a pure wick in place. She denies pain, nausea, vomiting. Family at the bedside. She is eating and drinking without difficulty. Bed is to the floor, bed alarm set, telly camera in place for sundowning issues (according to family) Will monitor.

## 2020-11-14 NOTE — ED NOTES
Spoke with patient daughter, Malathi Saenz, and patient , Amara Perez, regarding patient confusion and events that occurred prior to patient coming to ED. Per patient daughter, patient has been having chronic diarrhea over the last few months, resulting in patient getting colonoscopy and biposy done by Dr. Daniel Del Valle on Wednesday 11/11. 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 CT performed yesterday 11/12 at [facility], and family was told that the cancer had not metastasized. Patient family states that tonight patient started complaining of back pain around 5 pm, and then had an episode of incontinence. Pt. Family states that patient  was cleaning patient up and put patient on toilet when he contacted Malathi Saenz, patient daughter. Patient daughter states that patient was unable to get off toilet, and was continuing to use restroom. Patient family states that patient was more disoriented than normal (baseline confusion is short term memory loss). Patient family states that they then called 911.  SHELBIE Trevino notified regarding new patient information      Beth Pfeiffer RN  11/13/20 1626

## 2020-11-14 NOTE — PLAN OF CARE
Problem: Falls - Risk of:  Goal: Will remain free from falls  Description: Will remain free from falls  Outcome: Met This Shift  Note: No falls noted this shift. Patient ambulates with x1 staff assist. Family member at bedside. Bed kept in low position. Safe environment maintained. Bedside table & call light in reach. Uses call light appropriately when needing assistance. Goal: Absence of physical injury  Description: Absence of physical injury  Outcome: Met This Shift  Note: No physical injury noted this shift. Patient has been assessed for fall risk, fall precautions are in place, environment has been cleared of obstacles and reassessed during rounding, bed is in lowest position with the wheels locked, call light is within reach. ID band has been verified, appropriate transfer methods have been utilized and patient has been educated on safety, alarms utilized      Problem: Nutrition  Goal: Optimal nutrition therapy  Outcome: Met This Shift  Note: Patient has been eating % of her meals, she has orders for supplements at lunch and dinner.

## 2020-11-14 NOTE — PROGRESS NOTES
H/p dictation id [id number]. Date of service 11/13/2020. Complicated uti. Sirs. Htn. Dyslipidemia. Hypothyroidism.

## 2020-11-14 NOTE — PROGRESS NOTES
Perfect serve to Dr. Nagi Aceves;  Blood Culture + for [facility] in the urine.  Thanks

## 2020-11-14 NOTE — ED PROVIDER NOTES
[address] [facility]        Pt Name: Elsie Cardona  MRN: [id number]  [place] 1940  Date of evaluation: 11/13/2020  Provider: PINKY Velez CNP  PCP: Bautista Lynn     I have seen and evaluated this patient with my supervising physician Marion Miller MD.    [address] [place] [address]       Chief Complaint   Patient presents with    Emesis     pt brought to ED via EMS from home for sudden onset of n/v started tonight. HISTORY OF PRESENT ILLNESS   (Location, Timing/Onset, Context/Setting, Quality, Duration, Modifying Factors, Severity, Associated Signs and Symptoms)  Note limiting factors. Elsie Cardona is a 78 y.o. female with PMH significant for HLD, HTN, thyroid disease, and incarcerated hernia who presents to the emergency department today complaining of abdominal pain with associated vomiting and fever. Onset was today. Patient is a very bad historian, and family is the primary historian. They advised the patient has mild dementia, but around 1700 hrs. this evening patient became much more disoriented. She was unable to make it to the bathroom and was complaining of back pain. When she finally did make it to the toilet, she was too weak to get off of the toilet. She was tested for Covid last Friday and was negative. They also report that patient had a colonoscopy on Wednesday by Dr. Lina Luis, and a CT scan of her abdomen yesterday which showed carcinoma of the cecum with no metastasis. Nursing Notes were all reviewed and agreed with or any disagreements were addressed in the HPI. REVIEW OF SYSTEMS    (2-9 systems for level 4, 10 or more for level 5)     Review of Systems   Constitutional: Positive for fatigue and fever. Negative for chills and diaphoresis. HENT: Negative. Eyes: Negative for visual disturbance. Respiratory: Negative for cough and shortness of breath.     Cardiovascular: Negative for chest pain. Gastrointestinal: Positive for abdominal pain, nausea and vomiting. Negative for blood in stool, constipation and diarrhea. Genitourinary: Negative for dysuria and hematuria. Musculoskeletal: Positive for back pain. Skin: Negative for color change and rash. Allergic/Immunologic: Negative for immunocompromised state. Neurological: Positive for weakness. Negative for dizziness, syncope and headaches. Psychiatric/Behavioral: Positive for confusion (dementia). All other systems reviewed and are negative. Positives and Pertinent negatives as per HPI. Except as noted above in the ROS, all other systems were reviewed and negative. PAST MEDICAL HISTORY     Past Medical History:   Diagnosis Date    Hyperlipidemia     Hypertension     Incarcerated hernia     Thyroid disease     hypothyroid    Wears partial dentures     upper         SURGICAL HISTORY     Past Surgical History:   Procedure Laterality Date    HARDWARE REMOVAL Right     right leg    INGUINAL HERNIA REPAIR Left 1-22-14    Left incarcerated inguinal hernia repair with mesh and exparel     LEG SURGERY Right     alexis placement    SHOULDER SURGERY Left 2008    rotator cuff repair         CURRENTMEDICATIONS       Previous Medications    ALENDRONATE SODIUM (FOSAMAX PO)    Take 70 mg by mouth once a week. sundays    AMLODIPINE (NORVASC) 2.5 MG TABLET    Take 2.5 mg by mouth nightly. ASPIRIN 81 MG TABLET    Take 81 mg by mouth daily. Stopped for surgery    CALCIUM CITRATE (CALCITRATE) 950 MG TABLET    Take 1 tablet by mouth daily. CHOLECALCIFEROL (VITAMIN D-3 PO)    Take 3,000 Units by mouth daily. Takes 3tabs of 1000iu daily to = 3000 units    FISH OIL    Take 1,200 mg by mouth daily. GLUCOSAMINE 500 MG CAPS    Take 1 tablet by mouth daily. LEVOTHYROXINE (SYNTHROID) 100 MCG TABLET    Take 100 mcg by mouth Daily. LOSARTAN-HYDROCHLOROTHIAZIDE (HYZAAR) 50-12.5 MG PER TABLET    Take 1 tablet by mouth daily. METOPROLOL (TOPROL-XL) 100 MG XL TABLET    Take 100 mg by mouth 2 times daily. MULTIPLE VITAMINS-MINERALS (MULTIVITAMIN PO)    Take 1 tablet by mouth daily. [employer]    SIMVASTATIN (ZOCOR) 40 MG TABLET    Take 40 mg by mouth nightly. ALLERGIES     Patient has no known allergies. FAMILYHISTORY     History reviewed. No pertinent family history. SOCIAL HISTORY       Social History     Tobacco Use    Smoking status: Never Smoker    Smokeless tobacco: Never Used   Substance Use Topics    Alcohol use: Not Currently     Comment: rarely    Drug use: No       SCREENINGS             PHYSICAL EXAM    (up to 7 for level 4, 8 or more for level 5)     ED Triage Vitals [11/13/20 1852]   BP Temp Temp Source Pulse Resp SpO2 Height Weight   133/66 102.4 °F (39.1 °C) Oral 91 19 92 % -- 138 lb 7.2 oz (62.8 kg)       Physical Exam  Vitals signs and nursing note reviewed. Constitutional:       General: She is not in acute distress. Appearance: Normal appearance. She is well-developed. She is ill-appearing. She is not toxic-appearing. HENT:      Head: Normocephalic and atraumatic. Mouth/Throat:      Mouth: Mucous membranes are dry. Eyes:      General: No scleral icterus. Extraocular Movements: Extraocular movements intact. Conjunctiva/sclera: Conjunctivae normal.   Neck:      Musculoskeletal: Full passive range of motion without pain and neck supple. No neck rigidity. Vascular: No JVD. Cardiovascular:      Rate and Rhythm: Normal rate and regular rhythm. Heart sounds: Normal heart sounds. Pulmonary:      Effort: Pulmonary effort is normal. No respiratory distress. Breath sounds: Normal breath sounds. Abdominal:      General: Bowel sounds are normal. There is no distension. Palpations: Abdomen is soft. Abdomen is not rigid. Tenderness: There is generalized abdominal tenderness. There is no right CVA tenderness, left CVA tenderness, guarding or rebound. Musculoskeletal: Normal range of motion. Skin:     General: Skin is warm and dry. Capillary Refill: Capillary refill takes less than 2 seconds. Findings: No rash. Neurological:      General: No focal deficit present. Mental Status: She is alert. Comments: Alert to person and place but not year or president. Patient seems very confused when answering questions, and cannot end up answering most of them.    Psychiatric:         Mood and Affect: Mood normal.         DIAGNOSTIC RESULTS   LABS:    Labs Reviewed   COMPREHENSIVE METABOLIC PANEL W/ REFLEX TO MG FOR LOW K - Abnormal; Notable for the following components:       Result Value    Sodium 134 (*)     Potassium reflex Magnesium 3.3 (*)     Chloride 97 (*)     Glucose 418 (*)     Calcium 8.2 (*)     All other components within normal limits    Narrative:     Performed at:  [address] [place] [address]   Phone [phone]   LIPASE - Abnormal; Notable for the following components:    Lipase 10.0 (*)     All other components within normal limits    Narrative:     Performed at:  [facility]  [address] [facility] [employer] [address]   Phone [phone]   URINE RT REFLEX TO CULTURE - Abnormal; Notable for the following components:    Clarity, UA CLOUDY (*)     Glucose, Ur >=1000 (*)     Ketones, Urine 40 (*)     Blood, Urine SMALL (*)     Protein, UA 30 (*)     Nitrite, Urine POSITIVE (*)     Leukocyte Esterase, Urine MODERATE (*)     All other components within normal limits    Narrative:     Performed at:  [facility]  [address] [facility] [employer] [address]   Phone [phone]   PROCALCITONIN - Abnormal; Notable for the following components:    Procalcitonin 2.42 (*)     All other components within normal limits    Narrative:     Ant Rhodes tel. [phone],  Rejected Test Name/Called to: Arabella Senior Yo Deal, 11/13/2020 20:46, by [facility]  Performed at:  [facility]  [address] [place] [address]   Phone [phone]    Narrative:     Performed at:  [facility]  [address] [facility] [place] [address]   Phone [phone]   COVID-19   CBC WITH AUTO DIFFERENTIAL       All other labs were within normal range or not returned as of this dictation. EKG: All EKG's are interpreted by the Emergency Department Physician in the absence of a cardiologist.  Please see their note for interpretation of EKG. RADIOLOGY:   Non-plain film images such as CT, Ultrasound and MRI are read by the radiologist. Plain radiographic images are visualized and preliminarily interpreted by the ED Provider with the below findings:        Interpretation per the Radiologist below, if available at the time of this note:    CT ABDOMEN PELVIS W IV CONTRAST Additional Contrast? None   Final Result   Urothelial enhancement of bilateral renal collecting systems and ureters,   raising question of  infection. Correlate with urinalysis. Mural thickening is also seen of the sigmoid colon in the setting of   diverticula, for which diverticulitis could be considered in the appropriate   clinical setting. 2.0 cm hepatic hypodensity near the gallbladder fossa, potentially focal   fatty infiltration of the liver or underlying hepatic lesion. As warranted,   in the nonacute setting, liver protocol CT or MR could be performed for   further assessment. Atherosclerosis, including coronary artery calcification. XR CHEST PORTABLE   Final Result   No acute cardiopulmonary disease. No results found.         PROCEDURES   Unless otherwise noted below, none     Procedures    CRITICAL CARE TIME   CRITICAL CARE NOTE:  There was a high probability of clinically significant life-threatening deterioration of the patient's condition requiring moving all extremities. Generalized abdominal TTP. Abdomen soft without rigidity or peritoneal signs. Lungs CTA bilaterally and cardiac exam is normal.  Urine shows nitrites, moderate leukocytes, 4+ bacteria, and 100 WBCs. Lactic acid normal, procalcitonin 2.4. Mild leukocytosis without bandemia. Hemoglobin 11.6. Potassium 3.3 and magnesium 1.7. Hyperglycemia at 418 with a normal anion gap and bicarb. Renal function and LFTs normal.  Lipase and troponin negative. Rapid Covid negative. CT abdomen pelvis shows possible diverticulitis, 2 cm hepatic hypodensity, and urothelial enhancement raising the question for pyelonephritis. CXR normal.    Emergency department course included Tylenol for fever, fluids for dehydration, Zofran for nausea, and electrolyte replacement. She likely has acute pyelonephritis with sepsis--treated with Rocephin. Pressures stable. Will be admitted for further work-up. Was given all test results and given an opportunity to ask and have any questions answered. She was agreeable to admission. The hospitalist was consulted and agreed to admit patient and write orders. The patient tolerated their visit well. They were seen and evaluated by the attending physician, Petty Campbell MD who agreed with the assessment and plan. The patient and / or the family were informed of the results of any tests, a time was given to answer questions, a plan was proposed and they agreed with plan. FINAL IMPRESSION      1. Septicemia ([address]    2. Urinary tract infection without hematuria, site unspecified    3. Acute diverticulitis    4. Cecum mass    5. Altered mental status, unspecified altered mental status type    6. Elevated procalcitonin    7. Non-intractable vomiting with nausea, unspecified vomiting type    8. Dehydration    9. Hypokalemia    10. Hypomagnesemia    11.  Hyperglycemia          DISPOSITION/PLAN   DISPOSITION Admitted 11/13/2020 10:35:22 PM      PATIENT REFERREDTO:  No follow-up provider specified.     DISCHARGE MEDICATIONS:  New Prescriptions    No medications on file       DISCONTINUED MEDICATIONS:  Discontinued Medications    No medications on file              (Please note that portions of this note were completed with a voice recognition program.  Efforts were made to edit the dictations but occasionally words are mis-transcribed.)    PINKY Knox CNP (electronically signed)           PINKY Knox CNP  11/13/20 4170

## 2020-11-14 NOTE — PROGRESS NOTES
Patient alert and oriented times three. Patient oriented to the room. Fall risk assessment completed. Fall precautions in place. Call light within reach. Pt educated on calling for assistance before getting up. Walkway free of clutter. Will continue to monitor. Telecamera in place. Bed alarm in place.  Electronically signed by Timothy Rendon RN on 11/14/2020 at 1:53 AM

## 2020-11-15 LAB
ANION GAP SERPL CALCULATED.3IONS-SCNC: 8 MMOL/L (ref 3–16)
BASOPHILS ABSOLUTE: 0 K/UL (ref 0–0.2)
BASOPHILS RELATIVE PERCENT: 0.3 %
BUN BLDV-MCNC: 12 MG/DL (ref 7–20)
CALCIUM SERPL-MCNC: 8.4 MG/DL (ref 8.3–10.6)
CHLORIDE BLD-SCNC: 95 MMOL/L (ref 99–110)
CO2: 30 MMOL/L (ref 21–32)
CREAT SERPL-MCNC: 0.7 MG/DL (ref 0.6–1.2)
EOSINOPHILS ABSOLUTE: 0.1 K/UL (ref 0–0.6)
EOSINOPHILS RELATIVE PERCENT: 0.7 %
GFR AFRICAN AMERICAN: >60
GFR NON-AFRICAN AMERICAN: >60
GLUCOSE BLD-MCNC: 286 MG/DL (ref 70–99)
HCT VFR BLD CALC: 34.3 % (ref 36–48)
HEMOGLOBIN: 11.4 G/DL (ref 12–16)
LYMPHOCYTES ABSOLUTE: 0.9 K/UL (ref 1–5.1)
LYMPHOCYTES RELATIVE PERCENT: 10.8 %
MAGNESIUM: 1.8 MG/DL (ref 1.8–2.4)
MCH RBC QN AUTO: 30.5 PG (ref 26–34)
MCHC RBC AUTO-ENTMCNC: 33.3 G/DL (ref 31–36)
MCV RBC AUTO: 91.8 FL (ref 80–100)
MONOCYTES ABSOLUTE: 0.8 K/UL (ref 0–1.3)
MONOCYTES RELATIVE PERCENT: 9.1 %
NEUTROPHILS ABSOLUTE: 6.7 K/UL (ref 1.7–7.7)
NEUTROPHILS RELATIVE PERCENT: 79.1 %
PDW BLD-RTO: 12.8 % (ref 12.4–15.4)
PLATELET # BLD: 173 K/UL (ref 135–450)
PMV BLD AUTO: 9.1 FL (ref 5–10.5)
POTASSIUM REFLEX MAGNESIUM: 3.4 MMOL/L (ref 3.5–5.1)
RBC # BLD: 3.73 M/UL (ref 4–5.2)
SODIUM BLD-SCNC: 133 MMOL/L (ref 136–145)
WBC # BLD: 8.4 K/UL (ref 4–11)

## 2020-11-15 PROCEDURE — 85025 COMPLETE CBC W/AUTO DIFF WBC: CPT

## 2020-11-15 PROCEDURE — 2580000003 HC RX 258: Performed by: INTERNAL MEDICINE

## 2020-11-15 PROCEDURE — 36415 COLL VENOUS BLD VENIPUNCTURE: CPT

## 2020-11-15 PROCEDURE — 80048 BASIC METABOLIC PNL TOTAL CA: CPT

## 2020-11-15 PROCEDURE — 6360000002 HC RX W HCPCS: Performed by: INTERNAL MEDICINE

## 2020-11-15 PROCEDURE — 1200000000 HC SEMI PRIVATE

## 2020-11-15 PROCEDURE — 6370000000 HC RX 637 (ALT 250 FOR IP): Performed by: INTERNAL MEDICINE

## 2020-11-15 PROCEDURE — 83735 ASSAY OF MAGNESIUM: CPT

## 2020-11-15 RX ORDER — POTASSIUM CHLORIDE 20 MEQ/1
40 TABLET, EXTENDED RELEASE ORAL ONCE
Status: COMPLETED | OUTPATIENT
Start: 2020-11-15 | End: 2020-11-15

## 2020-11-15 RX ADMIN — ATORVASTATIN CALCIUM 20 MG: 20 TABLET, FILM COATED ORAL at 21:10

## 2020-11-15 RX ADMIN — METOPROLOL TARTRATE 100 MG: 50 TABLET, FILM COATED ORAL at 21:10

## 2020-11-15 RX ADMIN — CEFTRIAXONE 2 G: 2 INJECTION, POWDER, FOR SOLUTION INTRAMUSCULAR; INTRAVENOUS at 14:19

## 2020-11-15 RX ADMIN — LEVOTHYROXINE SODIUM 100 MCG: 0.1 TABLET ORAL at 06:14

## 2020-11-15 RX ADMIN — ACETAMINOPHEN 650 MG: 325 TABLET ORAL at 19:15

## 2020-11-15 RX ADMIN — ACETAMINOPHEN 650 MG: 325 TABLET ORAL at 06:14

## 2020-11-15 RX ADMIN — POTASSIUM CHLORIDE 40 MEQ: 1500 TABLET, EXTENDED RELEASE ORAL at 09:37

## 2020-11-15 RX ADMIN — ENOXAPARIN SODIUM 40 MG: 40 INJECTION SUBCUTANEOUS at 09:38

## 2020-11-15 RX ADMIN — Medication 10 ML: at 21:10

## 2020-11-15 RX ADMIN — ACETAMINOPHEN 650 MG: 325 TABLET ORAL at 13:21

## 2020-11-15 RX ADMIN — SODIUM CHLORIDE, POTASSIUM CHLORIDE, SODIUM LACTATE AND CALCIUM CHLORIDE: 600; 310; 30; 20 INJECTION, SOLUTION INTRAVENOUS at 02:44

## 2020-11-15 RX ADMIN — CALCIUM 1000 MG: 500 TABLET ORAL at 09:37

## 2020-11-15 RX ADMIN — METOPROLOL TARTRATE 100 MG: 50 TABLET, FILM COATED ORAL at 09:37

## 2020-11-15 ASSESSMENT — PAIN DESCRIPTION - FREQUENCY
FREQUENCY: INTERMITTENT

## 2020-11-15 ASSESSMENT — PAIN - FUNCTIONAL ASSESSMENT
PAIN_FUNCTIONAL_ASSESSMENT: PREVENTS OR INTERFERES SOME ACTIVE ACTIVITIES AND ADLS

## 2020-11-15 ASSESSMENT — PAIN SCALES - GENERAL
PAINLEVEL_OUTOF10: 0
PAINLEVEL_OUTOF10: 4
PAINLEVEL_OUTOF10: 0
PAINLEVEL_OUTOF10: 4

## 2020-11-15 ASSESSMENT — PAIN DESCRIPTION - ORIENTATION
ORIENTATION: MID;LOWER

## 2020-11-15 ASSESSMENT — PAIN DESCRIPTION - LOCATION
LOCATION: ABDOMEN

## 2020-11-15 ASSESSMENT — PAIN DESCRIPTION - ONSET
ONSET: PROGRESSIVE

## 2020-11-15 ASSESSMENT — PAIN DESCRIPTION - DESCRIPTORS
DESCRIPTORS: ACHING

## 2020-11-15 ASSESSMENT — PAIN DESCRIPTION - PAIN TYPE
TYPE: ACUTE PAIN

## 2020-11-15 ASSESSMENT — PAIN DESCRIPTION - PROGRESSION
CLINICAL_PROGRESSION: GRADUALLY WORSENING
CLINICAL_PROGRESSION: GRADUALLY WORSENING
CLINICAL_PROGRESSION: GRADUALLY IMPROVING

## 2020-11-15 NOTE — PROGRESS NOTES
Patient in bed she is alert and oriented x3 not to time. Pure wick continues to be in place. She denies pain, nausea, vomiting. Fluids continue to infuse related to fever. She denies needs at this time and is on the phone with her . Call light in reach, bed alarm set, call light in reach. Will monitor.

## 2020-11-15 NOTE — PROGRESS NOTES
Progress Note    Admit Date: 11/13/2020         Subjective and Overnight Events: Pt being followed up for UTI  Feels better still weak and tired  No vomit  Good po  Fever overnight     Objective:   Vitals: /61   Pulse 69   Temp 101.6 °F (38.7 °C) (Oral)   Resp 16   Ht 5' (1.524 m)   Wt 141 lb 15.6 oz (64.4 kg)   SpO2 96%   BMI 27.73 kg/m²   /61   Pulse 69   Temp 101.6 °F (38.7 °C) (Oral)   Resp 16   Ht 5' (1.524 m)   Wt 141 lb 15.6 oz (64.4 kg)   SpO2 96%   BMI 27.73 kg/m²     General Appearance:    Alert, cooperative, no distress, appears stated age   Head:    Normocephalic, without obvious abnormality, atraumatic   Eyes:    PERRL, conjunctiva/corneas clear       Ears:    Normal TM's and external ear canals, both ears   Nose:   Nares normal, septum midline, mucosa normal   Throat:   Lips, mucosa, and tongue normal; teeth and gums normal           Lungs:     Clear to auscultation bilaterally, respirations unlabored       Heart:    Regular rate and rhythm, S1 and S2 normal, no murmur, rub   or gallop   Abdomen:     Soft, non-tender, bowel sounds active all four quadrants,     no masses, no organomegaly           Extremities:   Extremities normal, atraumatic, no cyanosis or edema   Pulses:   2+ and symmetric all extremities   Skin:   Skin color, texture, turgor normal, no rashes or lesions       Neurologic:   CNII-XII intact.  Normal strength, sensation and reflexes       throughout         Pain is:Mild  Nausea:Mild  Bowel Movement/Flatus yes    Data:     Scheduled Medications:    calcium elemental  1,000 mg Oral Daily    levothyroxine  100 mcg Oral Daily    atorvastatin  20 mg Oral Nightly    sodium chloride flush  10 mL Intravenous 2 times per day    enoxaparin  40 mg Subcutaneous Daily    metoprolol tartrate  100 mg Oral BID    cefTRIAXone (ROCEPHIN) IV  2 g Intravenous Q24H      PRN Medications: sodium chloride flush, acetaminophen **OR** acetaminophen, promethazine **OR** ondansetron  Diet: DIET GENERAL;  Dietary Nutrition Supplements: Standard High Calorie Oral Supplement    Continuous Infusions:   lactated ringers 75 mL/hr at 11/15/20 0244         Intake/Output Summary (Last 24 hours) at 11/15/2020 0740  Last data filed at 11/15/2020 0615  Gross per 24 hour   Intake 2439 ml   Output 1350 ml   Net 1089 ml       CBC:   Recent Labs     11/14/20  0213 11/15/20  0437   WBC 14.3* 8.4   HGB 12.0 11.4*    173     BMP:  Recent Labs     11/13/20  1950 11/15/20  0437   * 133*   K 3.3* 3.4*   CL 97* 95*   CO2 24 30   BUN 12 12   CREATININE 0.6 0.7   GLUCOSE 418* 286*     ABGs: No results found for: PHART, PO2ART, WBO4FEE    Assessment/plan     Patient Active Problem List:     Incarcerated inguinal hernia     Diverticulitis     Complicated UTI (urinary tract infection)    - sepsis present on admission T + WBC + source - UTI  - Complicated gram-negative bacterial urinary tract infection with  systemic inflammatory response syndrome. - Hypertension.  - Hypothyroidism.  - Dyslipidemia.     Plan  BBx + for Ecoli + cefepime 2 grams   Continue hydration  Replace lytes   DC in am if better     Full Tonia Kat MD

## 2020-11-15 NOTE — PROGRESS NOTES
Patient in bed, she putout 1100ml of urine this shift with the purewick. She has had a tempeture low grade most of the shift. Her vitals are stable. She became confused after a nap this shift and her  became very concerned. I evaluated the patient and she was back to her baseline in a few minutes. She reported pain to her lower, mid abdomen this shift and was given PRN Tylenol. Fluids have been discontinued per Dr Lizbeth Faith. Telly camera in the room,  at the bedside, call light in reach, bed alarm set. Will monitor.

## 2020-11-15 NOTE — PLAN OF CARE
Problem: Falls - Risk of:  Goal: Will remain free from falls  Description: Will remain free from falls  Outcome: Met This Shift  Note: No falls noted this shift. Patient is on bedrest and has not been up this shift. Bed kept in low position. Safe environment maintained. Bedside table & call light in reach. Uses call light appropriately when needing assistance. Goal: Absence of physical injury  Description: Absence of physical injury  Outcome: Met This Shift  Note: No physical injury noted this shift. Patient has been assessed for fall risk, fall precautions are in place, environment has been cleared of obstacles and reassessed during rounding, bed is in lowest position with the wheels locked, call light is within reach. ID band has been verified,  patient has been educated on safety. Problem: Nutrition  Goal: Optimal nutrition therapy  Outcome: Ongoing  Note: She reports she is not hungary and this has been going on for a long time.

## 2020-11-15 NOTE — PLAN OF CARE
Problem: Falls - Risk of:  Goal: Will remain free from falls  Description: Will remain free from falls  11/14/2020 2241 by Annabelle Connors RN  Outcome: Ongoing     Problem: Falls - Risk of:  Goal: Absence of physical injury  Description: Absence of physical injury  11/14/2020 2241 by Annabelle Connors RN  Outcome: Ongoing   Fall risk assessment completed every shift. All precautions in place. Pt has call light within reach at all times. Room clear of clutter. Pt unaware to call for assistance when getting up. Tele sitter in place for added safety precaution.     Problem: Nutrition  Goal: Optimal nutrition therapy  11/14/2020 2241 by Annabelle Connors RN  Outcome: Ongoing

## 2020-11-15 NOTE — PROGRESS NOTES
Pt AAO x4, short term memory loss noted. No c/o pain, nausea or vomiting. Assessment completed and charted. IV site in RFA infiltrated. 2+edema noted. Arm elevated on a pillow. New IV site started in L wrist.  IVF infusing without any difficulty. Tele sitter in place for added safety precaution. Denies any needs. Call light in reach. Will monitor.

## 2020-11-16 LAB
ANION GAP SERPL CALCULATED.3IONS-SCNC: 11 MMOL/L (ref 3–16)
BANDED NEUTROPHILS RELATIVE PERCENT: 1 % (ref 0–7)
BASOPHILS ABSOLUTE: 0 K/UL (ref 0–0.2)
BASOPHILS RELATIVE PERCENT: 0 %
BUN BLDV-MCNC: 9 MG/DL (ref 7–20)
CALCIUM SERPL-MCNC: 8.2 MG/DL (ref 8.3–10.6)
CHLORIDE BLD-SCNC: 95 MMOL/L (ref 99–110)
CO2: 22 MMOL/L (ref 21–32)
CREAT SERPL-MCNC: 0.5 MG/DL (ref 0.6–1.2)
CULTURE, BLOOD 2: ABNORMAL
CULTURE, BLOOD 2: ABNORMAL
EOSINOPHILS ABSOLUTE: 0.1 K/UL (ref 0–0.6)
EOSINOPHILS RELATIVE PERCENT: 1 %
GFR AFRICAN AMERICAN: >60
GFR NON-AFRICAN AMERICAN: >60
GLUCOSE BLD-MCNC: 276 MG/DL (ref 70–99)
HCT VFR BLD CALC: 34.7 % (ref 36–48)
HEMOGLOBIN: 11.7 G/DL (ref 12–16)
LYMPHOCYTES ABSOLUTE: 1.1 K/UL (ref 1–5.1)
LYMPHOCYTES RELATIVE PERCENT: 13 %
MCH RBC QN AUTO: 30.6 PG (ref 26–34)
MCHC RBC AUTO-ENTMCNC: 33.7 G/DL (ref 31–36)
MCV RBC AUTO: 90.8 FL (ref 80–100)
MONOCYTES ABSOLUTE: 0.7 K/UL (ref 0–1.3)
MONOCYTES RELATIVE PERCENT: 8 %
NEUTROPHILS ABSOLUTE: 6.9 K/UL (ref 1.7–7.7)
NEUTROPHILS RELATIVE PERCENT: 77 %
ORGANISM: ABNORMAL
PDW BLD-RTO: 12.5 % (ref 12.4–15.4)
PLATELET # BLD: 180 K/UL (ref 135–450)
PLATELET SLIDE REVIEW: ADEQUATE
PMV BLD AUTO: 9.3 FL (ref 5–10.5)
POTASSIUM REFLEX MAGNESIUM: 3.7 MMOL/L (ref 3.5–5.1)
RBC # BLD: 3.82 M/UL (ref 4–5.2)
RBC # BLD: NORMAL 10*6/UL
SLIDE REVIEW: ABNORMAL
SODIUM BLD-SCNC: 128 MMOL/L (ref 136–145)
SODIUM URINE: 84 MMOL/L
TSH SERPL DL<=0.05 MIU/L-ACNC: 10.64 UIU/ML (ref 0.27–4.2)
URINE CULTURE, ROUTINE: ABNORMAL
VACUOLATED NEUTROPHILS: PRESENT
WBC # BLD: 8.8 K/UL (ref 4–11)

## 2020-11-16 PROCEDURE — 94760 N-INVAS EAR/PLS OXIMETRY 1: CPT

## 2020-11-16 PROCEDURE — 2580000003 HC RX 258: Performed by: INTERNAL MEDICINE

## 2020-11-16 PROCEDURE — 97530 THERAPEUTIC ACTIVITIES: CPT

## 2020-11-16 PROCEDURE — 84300 ASSAY OF URINE SODIUM: CPT

## 2020-11-16 PROCEDURE — 6370000000 HC RX 637 (ALT 250 FOR IP): Performed by: INTERNAL MEDICINE

## 2020-11-16 PROCEDURE — 36415 COLL VENOUS BLD VENIPUNCTURE: CPT

## 2020-11-16 PROCEDURE — 97162 PT EVAL MOD COMPLEX 30 MIN: CPT

## 2020-11-16 PROCEDURE — 97535 SELF CARE MNGMENT TRAINING: CPT

## 2020-11-16 PROCEDURE — 85025 COMPLETE CBC W/AUTO DIFF WBC: CPT

## 2020-11-16 PROCEDURE — 87040 BLOOD CULTURE FOR BACTERIA: CPT

## 2020-11-16 PROCEDURE — 80048 BASIC METABOLIC PNL TOTAL CA: CPT

## 2020-11-16 PROCEDURE — 6360000002 HC RX W HCPCS: Performed by: INTERNAL MEDICINE

## 2020-11-16 PROCEDURE — 1200000000 HC SEMI PRIVATE

## 2020-11-16 PROCEDURE — 97116 GAIT TRAINING THERAPY: CPT

## 2020-11-16 PROCEDURE — 97165 OT EVAL LOW COMPLEX 30 MIN: CPT

## 2020-11-16 PROCEDURE — 84443 ASSAY THYROID STIM HORMONE: CPT

## 2020-11-16 RX ADMIN — METOPROLOL TARTRATE 100 MG: 50 TABLET, FILM COATED ORAL at 09:52

## 2020-11-16 RX ADMIN — ACETAMINOPHEN 650 MG: 325 TABLET ORAL at 14:26

## 2020-11-16 RX ADMIN — LEVOTHYROXINE SODIUM 100 MCG: 0.1 TABLET ORAL at 05:23

## 2020-11-16 RX ADMIN — CEFTRIAXONE 2 G: 2 INJECTION, POWDER, FOR SOLUTION INTRAMUSCULAR; INTRAVENOUS at 14:26

## 2020-11-16 RX ADMIN — Medication 10 ML: at 21:13

## 2020-11-16 RX ADMIN — CALCIUM 1000 MG: 500 TABLET ORAL at 09:52

## 2020-11-16 RX ADMIN — METOPROLOL TARTRATE 100 MG: 50 TABLET, FILM COATED ORAL at 21:13

## 2020-11-16 RX ADMIN — ENOXAPARIN SODIUM 40 MG: 40 INJECTION SUBCUTANEOUS at 09:52

## 2020-11-16 RX ADMIN — ATORVASTATIN CALCIUM 20 MG: 20 TABLET, FILM COATED ORAL at 21:13

## 2020-11-16 ASSESSMENT — PAIN SCALES - GENERAL
PAINLEVEL_OUTOF10: 3
PAINLEVEL_OUTOF10: 0

## 2020-11-16 NOTE — PROGRESS NOTES
Pt rested quietly overnight. Remained alert and oriented. Highest temp during the night was 99.5. Pt denies any needs. Will continue to monitor.

## 2020-11-16 NOTE — PROGRESS NOTES
Hospitalist Progress Note      PCP: Josefa De La Garza    Date of Admission: 11/13/2020    Subjective: spiked a fever with tmax 100.1F, denies any complaints    Medications:  Reviewed    Infusion Medications   Scheduled Medications    calcium elemental  1,000 mg Oral Daily    levothyroxine  100 mcg Oral Daily    atorvastatin  20 mg Oral Nightly    sodium chloride flush  10 mL Intravenous 2 times per day    enoxaparin  40 mg Subcutaneous Daily    metoprolol tartrate  100 mg Oral BID    cefTRIAXone (ROCEPHIN) IV  2 g Intravenous Q24H     PRN Meds: sodium chloride flush, acetaminophen **OR** acetaminophen, promethazine **OR** ondansetron      Intake/Output Summary (Last 24 hours) at 11/16/2020 1849  Last data filed at 11/16/2020 1559  Gross per 24 hour   Intake 180 ml   Output 2350 ml   Net -2170 ml       Physical Exam Performed:    /72   Pulse 75   Temp 100.2 °F (37.9 °C) (Oral)   Resp 14   Ht 5' (1.524 m)   Wt 141 lb 15.6 oz (64.4 kg)   SpO2 93%   BMI 27.73 kg/m²     General: Awake, oriented  HEENT: Pupils round, reacting to light  Heart: S1 S2 heard, no murmurs  Lung: Clear b/l  Abd: Soft, not distended, not tender, BS +  Extr: No cyanosis or clubbing  Neuro: No focal deficits. Labs:   Recent Labs     11/14/20  0213 11/15/20  0437 11/16/20  0517   WBC 14.3* 8.4 8.8   HGB 12.0 11.4* 11.7*   HCT 37.0 34.3* 34.7*    173 180     Recent Labs     11/13/20  1950 11/15/20  0437 11/16/20  0517   * 133* 128*   K 3.3* 3.4* 3.7   CL 97* 95* 95*   CO2 24 30 22   BUN 12 12 9   CREATININE 0.6 0.7 0.5*   CALCIUM 8.2* 8.4 8.2*     Recent Labs     11/13/20 1950   AST 27   ALT 16   BILITOT 0.6   ALKPHOS 111     No results for input(s): INR in the last 72 hours.   Recent Labs     11/13/20 1950   TROPONINI <0.01       Urinalysis:      Lab Results   Component Value Date    NITRU POSITIVE 11/13/2020    WBCUA 97 11/13/2020    BACTERIA 4+ 11/13/2020    RBCUA 1 11/13/2020    BLOODU SMALL 11/13/2020 SPECGRAV 1.025 11/13/2020    GLUCOSEU >=1000 11/13/2020       Radiology:  CT ABDOMEN PELVIS W IV CONTRAST Additional Contrast? None   Final Result   Addendum 1 of 1   ADDENDUM:   Report of outside CT dated 11/12/2020 has been subsequently obtained. Images   are not available for comparison. Cecal mass described on prior CT is not   well demonstrated on current CT as the cecum is decompressed and not   opacified. This could be further assessed with colonoscopy or short-term   interval follow-up exam with improved opacification of cecum. Final      XR CHEST PORTABLE   Final Result   No acute cardiopulmonary disease. Assessment/Plan:    Active Hospital Problems    Diagnosis    Complicated UTI (urinary tract infection) [N39.0]       - sepsis present on admission Tachy + WBC + source - UTI, pt still spiked a fever, monitor  - Complicated gram-negative bacterial urinary tract infection with e coli - cont IV abx  - bacteremia - blood cx positive for e coli, repeat blood cx sent  - Hypertension - controlled, cont home meds  - Hypothyroidism.  - Dyslipidemia.       Diet: DIET GENERAL;  Dietary Nutrition Supplements: Standard High Calorie Oral Supplement  Code Status: Full Code    PT/OT Eval Status: ordered    Dispo - poss dc in am on PO abx if blood cx neg and if afebrile    Yanet Hammer MD

## 2020-11-16 NOTE — PROGRESS NOTES
Pt AAO x4, can be forgetful. No c/o pain. Assessment completed and charted. Pt was just medicated for a temp of 100.1 at [address]. Will monitor. Tele sitter in room for added safety precaution. Denies needs at this time. Call light in reach. Will monitor.

## 2020-11-16 NOTE — PROGRESS NOTES
Luca Baum is a 78 y.o. female with PMH significant for HLD, HTN, thyroid disease, and incarcerated hernia who presents to the emergency department today complaining of abdominal pain with associated vomiting and fever. Onset was today. Patient is a very bad historian, and family is the primary historian. They advised the patient has mild dementia, but around 1700 hrs. this evening patient became much more disoriented. She was unable to make it to the bathroom and was complaining of back pain. When she finally did make it to the toilet, she was too weak to get off of the toilet. She was tested for Covid last Friday and was negative. They also report that patient had a colonoscopy on Wednesday by Dr. 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 (copied per Albertina Husain, APRN 11/13)  Exam: fx mobility, transfers, ADLs, cognition, UE ROM, strength  OT Education: OT Role;Plan of Care  REQUIRES OT FOLLOW UP: Yes  Activity Tolerance  Activity Tolerance: Patient Tolerated treatment well  Safety Devices  Safety Devices in place: Yes  Type of devices: Left in chair;Call light within reach; Chair alarm in place;Gait belt           Patient Diagnosis(es): The primary encounter diagnosis was Septicemia ([facility] [address] Diagnoses of Urinary tract infection without hematuria, site unspecified, Acute diverticulitis, Cecum mass, Altered mental status, unspecified altered mental status type, Elevated procalcitonin, Non-intractable vomiting with nausea, unspecified vomiting type, Dehydration, Hypokalemia, Hypomagnesemia, and Hyperglycemia were also pertinent to this visit. has a past medical history of Hyperlipidemia, Hypertension, Incarcerated hernia, Thyroid disease, and Wears partial dentures. has a past surgical history that includes shoulder surgery (Left, 2008); Leg Surgery (Right); Hardware Removal (Right); and Inguinal hernia repair (Left, 1-22-14). Restrictions  Restrictions/Precautions  Restrictions/Precautions: Fall Risk  Position Activity Restriction  Other position/activity restrictions: IV in L wrist    Subjective   General  Chart Reviewed: Yes, Orders, Progress Notes, History and Physical  Additional Pertinent Hx: Meredith Fatima is a 78 y.o. female with PMH significant for HLD, HTN, thyroid disease, and incarcerated hernia who presents to the emergency department today complaining of abdominal pain with associated vomiting and fever. Onset was today. Patient is a very bad historian, and family is the primary historian. They advised the patient has mild dementia, but around 1700 hrs. this evening patient became much more disoriented. She was unable to make it to the bathroom and was complaining of back pain. When she finally did make it to the toilet, she was too weak to get off of the toilet. She was tested for Covid last Friday and was negative. They also report that patient had a colonoscopy on Wednesday by Dr. 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  (copied per Cal Morales, APRN 11/13)  Family / Caregiver Present: Yes()  Referring Practitioner: Judith Mesa  Diagnosis: complicated UTI  Subjective  Subjective: Pt met bedside, agreeable to OT, no c/o of pain    Social/Functional History  Social/Functional History  Lives With: Spouse  Type of Home: (condo - second floor)  Home Access: Stairs to enter with rails  Entrance Stairs - Number of Steps: 16 BROWN  Entrance Stairs - Rails: Right  Bathroom Shower/Tub: Walk-in shower  Bathroom Toilet: Standard  Bathroom Accessibility: Walker accessible  ADL Assistance: Independent  Homemaking Assistance: Independent  Ambulation Assistance: Independent  Transfer Assistance: Independent  Active : Yes  Mode of Transportation: Car  Occupation: Retired  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  Cognition Comment: Pt able to problem solve through tasks, respond appropriately to questions, recall home set up, and demonstrated appropriate insight to current deficits. Pt  reports pt cognition declines when she has a fever. Sensation  Overall Sensation Status: WFL        LUE AROM (degrees)  LUE AROM : WFL  Left Hand AROM (degrees)  Left Hand AROM: WFL  RUE AROM (degrees)  RUE AROM : WFL  Right Hand AROM (degrees)  Right Hand AROM: WFL  LUE Strength  Gross LUE Strength: WFL  RUE Strength  Gross RUE Strength: WFL                   Plan   Plan  Times per week: 3-5  Plan weeks: by discharge  Current Treatment Recommendations: Strengthening, Patient/Caregiver Education & Training, Equipment Evaluation, Education, & procurement, Balance Training, Functional Mobility Training, Endurance Training, Safety Education & Training, Self-Care / ADL                                                    AM-PAC Score        AM-[facility] Inpatient Daily Activity Raw Score: 19 (11/16/20 1559)  -PAC Inpatient ADL T-Scale Score : 40.22 (11/16/20 1559)  ADL Inpatient CMS 0-100% Score: 42.8 (11/16/20 1559)  ADL Inpatient CMS G-Code Modifier : CK (11/16/20 1559)    Goals  Short term goals  Time Frame for Short term goals: by discharge  Short term goal 1: Pt will complete self care indep  Short term goal 2: Pt will complete fx mobility indep  Short term goal 3: Pt will complete transfers indep  Short term goal 4: Pt will increase activity tolerance by standing for >5 min to complete ADL task.   Patient Goals   Patient goals : \"to take care of myself and be able to make Zeen[employer] cookies at home\"       Therapy Time   Individual Concurrent Group Co-treatment   Time In 1500 Time Out 1600         Minutes 60         Timed Code Treatment Minutes: 45 Minutes(+15 min eval)     Therapist was present, directed the patient's care, made skilled judgement, and was responsible for assessment and treatment of the patient.         KIM Middleton OTR/NIDIA  #[id number] 11/16/20 4:18 PM

## 2020-11-16 NOTE — PROGRESS NOTES
Physical Therapy    Facility/Department: [address] ORTHOPEDICS  Initial Assessment  This note serves as patient discharge summary if pt discharges prior to next PT visit      NAME: Ne Resendiz  : 1940  MRN: [id number]    Date of Service: 2020    Discharge Recommendations:  24 hour supervision or assist, Home with assist PRN   Ne Resendiz scored a 19/24 on the AM-PAC short mobility form. PT Equipment Recommendations  Equipment Needed: No    Assessment   Body structures, Functions, Activity limitations: Decreased safe awareness;Decreased functional mobility   Assessment: Prior to hospital admit with septicemia and UTI, in setting of recently dx'd colon CA, patient lived in home with  and was independent with all functional mobility icluding ADLs, transfers, gait without device, driving. Status 2020: SBA for bed mobility, transfers, and gait 61' with multiple turns. Gait technique is good. Anticipate patient will be able to return to home as prior per medical OK. Chart notes patient with mild dementia, and telesittter in room. In light of this, would recommend at least frequent-24/7 supervision-assist for patient safety. Treatment Diagnosis: Impaired functional mobility  Prognosis: Good  Decision Making: Medium Complexity  History: as noted  Clinical Presentation: Evolving  PT Education: Goals;PT Role;Plan of Care  REQUIRES PT FOLLOW UP: Yes  Activity Tolerance  Activity Tolerance: Patient Tolerated treatment well       Patient Diagnosis(es): The primary encounter diagnosis was Septicemia ([facility] [address] Diagnoses of Urinary tract infection without hematuria, site unspecified, Acute diverticulitis, Cecum mass, Altered mental status, unspecified altered mental status type, Elevated procalcitonin, Non-intractable vomiting with nausea, unspecified vomiting type, Dehydration, Hypokalemia, Hypomagnesemia, and Hyperglycemia were also pertinent to this visit.      has a past medical history of Hyperlipidemia, Hypertension, Incarcerated hernia, Thyroid disease, and Wears partial dentures. has a past surgical history that includes shoulder surgery (Left, 2008); Leg Surgery (Right); Hardware Removal (Right); and Inguinal hernia repair (Left, 1-22-14). Restrictions  Restrictions/Precautions  Restrictions/Precautions: Fall Risk  Position Activity Restriction  Other position/activity restrictions: IV in L wrist  Vision/Hearing  Vision: Impaired  Vision Exceptions: Wears glasses at all times  Hearing: Within functional limits     Subjective  General  Chart Reviewed: Yes  Additional Pertinent Hx: Per ED note:  Naa Kaur is a 78 y.o. female with PMH significant for HLD, HTN, thyroid disease, and incarcerated hernia who presents to the emergency department today complaining of abdominal pain with associated vomiting and fever. Onset was today. Patient is a very bad historian, and family is the primary historian. They advised the patient has mild dementia, but around 1700 hrs. this evening patient became much more disoriented. She was unable to make it to the bathroom and was complaining of back pain. When she finally did make it to the toilet, she was too weak to get off of the toilet. She was tested for Covid last Friday and was negative. They also report that patient had a colonoscopy on Wednesday by Dr. 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 \"  Admitting dxs: Septicemia, UTI without hematuria. Response To Previous Treatment: Not applicable  Family / Caregiver Present: Yes()  Referring Practitioner: Krystal Patel MD  Referral Date : 11/16/20  Subjective  Subjective: Patient in bed. Awake. Agreeable to therapy. Denies pain.   Pain Screening  Patient Currently in Pain: Denies    Orientation  Orientation  Overall Orientation Status: Within Functional Limits     Social/Functional History  Social/Functional History  Lives With: Spouse  Type of Home: (condo - second floor)  Home Access: Stairs to enter with rails  Entrance Stairs - Number of Steps: 16 BROWN  Entrance Stairs - Rails: Right  Bathroom Shower/Tub: Walk-in shower  Bathroom Toilet: Standard  Bathroom Accessibility: Walker accessible  ADL Assistance: Independent  Homemaking Assistance: Independent  Ambulation Assistance: Independent  Transfer Assistance: Independent  Active : Yes  Mode of Transportation: Car  Occupation: Retired  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 Pt  reports pt cognition declines when she has a fever. Objective  AROM RLE (degrees)  RLE AROM: WNL  AROM LLE (degrees)  LLE AROM : WNL  Strength RLE  Strength RLE: WNL  Comment: symmetrical  Strength LLE  Strength LLE: WNL  Comment: symmetrical  Sensation  Overall Sensation Status: WNL  Bed mobility  Supine to Sit: Stand by assistance(HOB up, 1 rail. Exiting to L side.)  Transfers  Sit to Stand: Stand by assistance(visitor chair, EOB)  Stand to sit: Stand by assistance(Visitor chair.)  Ambulation  Ambulation?: Yes  Ambulation 1  Surface: level tile  Device: No Device  Assistance: Stand by assistance  Quality of Gait: controlled, step through pattern. Trace deviated gait path, without loss of balance. Gait Deviations: None  Distance: 60', multiple turns. Stairs/Curb  Stairs?: No  Gait Deviations  Gait Deviations: None  Balance  Posture: Good  Sitting - Static: Good  Sitting - Dynamic: Good  Standing - Static: Good  Standing - Dynamic: Good  Comments: Patient sits at EOB and dons footies.       Plan   Plan  Times per week: 1-3 visits as indicaed  Current Treatment Recommendations: Functional Mobility Training, Transfer

## 2020-11-16 NOTE — PROGRESS NOTES
Pt. Awake purewick in place for urine collection pt has brief on that is dry and intact . Pt.'s  is at the bedside . They have no complaints at this time.

## 2020-11-17 LAB
ANION GAP SERPL CALCULATED.3IONS-SCNC: 9 MMOL/L (ref 3–16)
BASOPHILS ABSOLUTE: 0 K/UL (ref 0–0.2)
BASOPHILS RELATIVE PERCENT: 0.5 %
BLOOD CULTURE, ROUTINE: NORMAL
BUN BLDV-MCNC: 11 MG/DL (ref 7–20)
CALCIUM SERPL-MCNC: 8.8 MG/DL (ref 8.3–10.6)
CHLORIDE BLD-SCNC: 98 MMOL/L (ref 99–110)
CO2: 29 MMOL/L (ref 21–32)
CREAT SERPL-MCNC: 0.6 MG/DL (ref 0.6–1.2)
EOSINOPHILS ABSOLUTE: 0.1 K/UL (ref 0–0.6)
EOSINOPHILS RELATIVE PERCENT: 2.2 %
GFR AFRICAN AMERICAN: >60
GFR NON-AFRICAN AMERICAN: >60
GLUCOSE BLD-MCNC: 275 MG/DL (ref 70–99)
HCT VFR BLD CALC: 33.4 % (ref 36–48)
HEMOGLOBIN: 11.3 G/DL (ref 12–16)
LYMPHOCYTES ABSOLUTE: 1.3 K/UL (ref 1–5.1)
LYMPHOCYTES RELATIVE PERCENT: 22.2 %
MCH RBC QN AUTO: 30.2 PG (ref 26–34)
MCHC RBC AUTO-ENTMCNC: 33.8 G/DL (ref 31–36)
MCV RBC AUTO: 89.4 FL (ref 80–100)
MONOCYTES ABSOLUTE: 0.7 K/UL (ref 0–1.3)
MONOCYTES RELATIVE PERCENT: 11.7 %
NEUTROPHILS ABSOLUTE: 3.8 K/UL (ref 1.7–7.7)
NEUTROPHILS RELATIVE PERCENT: 63.4 %
PDW BLD-RTO: 12.7 % (ref 12.4–15.4)
PLATELET # BLD: 198 K/UL (ref 135–450)
PMV BLD AUTO: 8.8 FL (ref 5–10.5)
POTASSIUM REFLEX MAGNESIUM: 3.7 MMOL/L (ref 3.5–5.1)
RBC # BLD: 3.74 M/UL (ref 4–5.2)
SODIUM BLD-SCNC: 136 MMOL/L (ref 136–145)
WBC # BLD: 5.9 K/UL (ref 4–11)

## 2020-11-17 PROCEDURE — 2500000003 HC RX 250 WO HCPCS: Performed by: INTERNAL MEDICINE

## 2020-11-17 PROCEDURE — 6370000000 HC RX 637 (ALT 250 FOR IP): Performed by: INTERNAL MEDICINE

## 2020-11-17 PROCEDURE — 2580000003 HC RX 258: Performed by: INTERNAL MEDICINE

## 2020-11-17 PROCEDURE — 85025 COMPLETE CBC W/AUTO DIFF WBC: CPT

## 2020-11-17 PROCEDURE — 36415 COLL VENOUS BLD VENIPUNCTURE: CPT

## 2020-11-17 PROCEDURE — 6360000002 HC RX W HCPCS: Performed by: INTERNAL MEDICINE

## 2020-11-17 PROCEDURE — 97535 SELF CARE MNGMENT TRAINING: CPT

## 2020-11-17 PROCEDURE — 97530 THERAPEUTIC ACTIVITIES: CPT

## 2020-11-17 PROCEDURE — 1200000000 HC SEMI PRIVATE

## 2020-11-17 PROCEDURE — 80048 BASIC METABOLIC PNL TOTAL CA: CPT

## 2020-11-17 RX ORDER — CIPROFLOXACIN 500 MG/1
500 TABLET, FILM COATED ORAL 2 TIMES DAILY
Qty: 20 TABLET | Refills: 0 | Status: SHIPPED | OUTPATIENT
Start: 2020-11-17 | End: 2020-11-27

## 2020-11-17 RX ORDER — METRONIDAZOLE 500 MG/1
500 TABLET ORAL 3 TIMES DAILY
Qty: 30 TABLET | Refills: 0 | Status: SHIPPED | OUTPATIENT
Start: 2020-11-17 | End: 2020-11-27

## 2020-11-17 RX ADMIN — METRONIDAZOLE 500 MG: 500 INJECTION, SOLUTION INTRAVENOUS at 20:39

## 2020-11-17 RX ADMIN — CEFTRIAXONE 2 G: 2 INJECTION, POWDER, FOR SOLUTION INTRAMUSCULAR; INTRAVENOUS at 14:57

## 2020-11-17 RX ADMIN — ATORVASTATIN CALCIUM 20 MG: 20 TABLET, FILM COATED ORAL at 20:39

## 2020-11-17 RX ADMIN — Medication 10 ML: at 20:39

## 2020-11-17 RX ADMIN — METOPROLOL TARTRATE 100 MG: 50 TABLET, FILM COATED ORAL at 20:39

## 2020-11-17 RX ADMIN — METRONIDAZOLE 500 MG: 500 INJECTION, SOLUTION INTRAVENOUS at 13:51

## 2020-11-17 RX ADMIN — ENOXAPARIN SODIUM 40 MG: 40 INJECTION SUBCUTANEOUS at 09:06

## 2020-11-17 RX ADMIN — CALCIUM 1000 MG: 500 TABLET ORAL at 09:06

## 2020-11-17 RX ADMIN — METOPROLOL TARTRATE 100 MG: 50 TABLET, FILM COATED ORAL at 09:05

## 2020-11-17 RX ADMIN — LEVOTHYROXINE SODIUM 100 MCG: 0.1 TABLET ORAL at 05:59

## 2020-11-17 ASSESSMENT — PAIN SCALES - GENERAL: PAINLEVEL_OUTOF10: 0

## 2020-11-17 NOTE — PROGRESS NOTES
PT AAO x4 per this shift with some confusion. Pt able to voice needs per this shfit. Pt up to bathroom with somewhat steady gait. VSS. Pt tolerating PO fluids. Pt cas pain. No futher needs voiced per this shift. Fall precautions in place. Bed alarm on. Call light within reach. Will continue to round. Electronically signed by Evelia London RN on 11/17/2020 at 1:42 AM

## 2020-11-17 NOTE — PLAN OF CARE
Problem: Falls - Risk of:  Goal: Will remain free from falls  Description: Will remain free from falls  11/16/2020 2311 by Tawana Woodruff RN  Outcome: Met This Shift  Note: Patient educated on fall prevention. Call light is within reach, bed locked in lowest position, personal items within reach, and bed alarm is on. Will round on patient per unit guidelines. 11/16/2020 1747 by Rian Angulo RN  Outcome: Ongoing  Goal: Absence of physical injury  Description: Absence of physical injury  11/16/2020 2311 by Tawana Woodruff RN  Outcome: Met This Shift  Note: Pt is free of injury. No injury noted. Fall precautions in place. Call light within reach. Will monitor. 11/16/2020 1747 by Rian Angulo RN  Outcome: Ongoing     Problem: Nutrition  Goal: Optimal nutrition therapy  11/16/2020 2311 by Tawana Woodruff RN  Outcome: Met This Shift  Note: Patient educated on proper nutrition. Patients intake monitored and patient assessed to make sure no assistance with eating was required. Patient encouraged to eat a well balanced diet. 11/16/2020 1747 by Rian Angulo RN  Outcome: Ongoing     Problem: Pain:  Goal: Pain level will decrease  Description: Pain level will decrease  11/16/2020 2311 by Tawana Woodruff RN  Outcome: Met This Shift  Note: Pt cas pain per this shift. 11/16/2020 1747 by Rian Angulo RN  Outcome: Ongoing  Goal: Control of acute pain  Description: Control of acute pain  11/16/2020 2311 by Tawana Woodruff RN  Outcome: Met This Shift  Note: Pt cas pain per this shift. 11/16/2020 1747 by Rian Angulo RN  Outcome: Ongoing  Goal: Control of chronic pain  Description: Control of chronic pain  11/16/2020 2311 by Tawana Woodruff RN  Outcome: Met This Shift  Note: Pt cas pain per this shift.    11/16/2020 1747 by Rian Angulo RN  Outcome: Ongoing     Problem: Skin Integrity:  Goal: Will show no infection signs and symptoms  Description: Will show no infection signs and symptoms  11/16/2020 2311 by Alistair Lopez RN  Outcome: Ongoing  Note: Pt assessed for infection, VVS, WBC being monitored. . Reviewed information with pt and family, pt verbalized understanding     11/16/2020 1747 by Virgie Meraz RN  Outcome: Ongoing  Goal: Absence of new skin breakdown  Description: Absence of new skin breakdown  11/16/2020 2311 by Alistair Lopez RN  Outcome: Ongoing  Note: Stanislaw score assessed. Patient able to ambulate with assistance and turn self. Repositioned patient Q2H and assessed skin. Educated patient on importance of repositioning to prevent skin issues.      11/16/2020 1747 by Virgie Merza RN  Outcome: Ongoing

## 2020-11-17 NOTE — PROGRESS NOTES
Pt is x4 oriented with some forgetfulness and confusion. Pt is able to speak her needs. Pt has a steady gait when got up to go to the bathroom a few times. Pt has no pain. Pt is a high fall risk and precautions are in place. Call light within reach. Table by bedside. Will continue to monitor.

## 2020-11-17 NOTE — CARE COORDINATION
INITIAL CASE MANAGEMENT ASSESSMENT    Reviewed chart, met with patient to assess possible discharge needs. Explained Case Management role/services. Living Situation: lives at home with  in a condo - they live on the second floor with [address]    ADLs: independent     DME: none    PT/OT Recs:    Discharge Recommendations:  24 hour supervision or assist, Home with assist FERNANDEZ   Marcel Davis scored a 19/24 on the AM-PAC short mobility form.      PT Equipment Recommendations  Equipment Needed: No    Active Services: none     Transportation:  will transport     Medications: no issues--uses [facility] in [facility]    PCP: Dr Devorah Montgomery      PLAN/COMMENTS: patient plans to return home with her  -- denies dc needs at present    SW/CM provided contact information for patient or family to call with any questions. SW/CM will follow and assist as needed.   Electronically signed by Bertin Rincon on 11/17/2020 at 12:47 PM

## 2020-11-17 NOTE — PROGRESS NOTES
Occupational Therapy  Facility/Department: [address] ORTHOPEDICS  Daily Treatment Note  NAME: Ne Resendiz  : 1940  MRN: [id number]    Date of Service: 2020    Discharge Recommendations:  Home with assist PRN  OT Equipment Recommendations  Other: TBD - possibly shower chair     Ne Resendiz scored a 22/24 on the AM-PAC ADL Inpatient form. Current research shows that an AM-PAC score of 18 or greater is typically associated with a discharge to the patient's home setting. Anticipate pt is functioning close to baseline and pt is safe to return home with assist prn from  with no further OT at this time. Please see assessment section for further patient specific details. If patient discharges prior to next session this note will serve as a discharge summary. Please see below for the latest assessment towards goals. Assessment   Performance deficits / Impairments: Decreased functional mobility ; Decreased endurance;Decreased ADL status; Decreased balance  Assessment: Pt tolerated treatment well. Pt completed fx mobility and transfers with no device with Supervision for room distances and SBA for further distances. Pt demonstrated some unsteadiness, but no LOB, when ambulating w/o device in hallway d/t increased distractability so required more SBA. Pt was indep in ADLs this date and anticipate pt is functioning close to baseline. Recommend pt return home with assist prn from  and no further skilled services upon discharge. Will continue to work with pt in acute care to address the above deficits to improve fx mobility and ADL participation. Prognosis: Good  OT Education: OT Role;Plan of Care  Activity Tolerance  Activity Tolerance: Patient Tolerated treatment well  Safety Devices  Type of devices: Call light within reach; Chair alarm in place;Gait belt;Left in chair         Patient Diagnosis(es): The primary encounter diagnosis was Septicemia ([facility] [address]  Diagnoses of Urinary tract infection without hematuria, site unspecified, Acute diverticulitis, Cecum mass, Altered mental status, unspecified altered mental status type, Elevated procalcitonin, Non-intractable vomiting with nausea, unspecified vomiting type, Dehydration, Hypokalemia, Hypomagnesemia, and Hyperglycemia were also pertinent to this visit. has a past medical history of Hyperlipidemia, Hypertension, Incarcerated hernia, Thyroid disease, and Wears partial dentures. has a past surgical history that includes shoulder surgery (Left, 2008); Leg Surgery (Right); Hardware Removal (Right); and Inguinal hernia repair (Left, 1-22-14). Restrictions  Restrictions/Precautions  Restrictions/Precautions: Fall Risk  Position Activity Restriction  Other position/activity restrictions: IV in L wrist  Subjective   General  Chart Reviewed: Yes, Progress Notes  Additional Pertinent Hx: Yonathan Ferreira is a 78 y.o. female with PMH significant for HLD, HTN, thyroid disease, and incarcerated hernia who presents to the emergency department today complaining of abdominal pain with associated vomiting and fever. Onset was today. Patient is a very bad historian, and family is the primary historian. They advised the patient has mild dementia, but around 1700 hrs. this evening patient became much more disoriented. She was unable to make it to the bathroom and was complaining of back pain. When she finally did make it to the toilet, she was too weak to get off of the toilet. She was tested for Covid last Friday and was negative. They also report that patient had a colonoscopy on Wednesday by Dr. 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 (copied per Fiordaliza Pair, APRN 11/13)  Family / Caregiver Present: Yes()  Referring Practitioner: Jeff Gonzales  Diagnosis: complicated UTI  Subjective  Subjective: Pt met bedside, agreeable to OT, no c/o of pain.            Objective ADL  Grooming: Independent(Pt completed oral care standing at sink)  UE Dressing: Setup(Pt doffed/donned hospital gown with assist to tie)  LE Dressing: Independent(Pt doffed/donned non skid socks by crossing legs while seated)        Balance  Standing Balance: Supervision  Standing Balance  Time: 3 min  Activity: oral care at sink  Comment: No LOB or c/o of dizziness when staning at sink. Functional Mobility  Functional - Mobility Device: No device  Activity: To/from bathroom; Other(hallway ~100 ft)  Assist Level: Stand by assistance(SBA/Supervision)  Functional Mobility Comments: Pt completed fx mobility to/from bathroom with no device and supervision. When ambulating in the hallway, pt required SBA with no device d/t increased distractability, some unsteadiness when making turns, and increased speed when ambulating. No IV line to manage this date. Wheelchair Bed Transfers  Wheelchair/Bed - Technique: Ambulating  Equipment Used: Bed;Other(recliner, lower couch in hallway)  Level of Asssistance: Supervision  Wheelchair Transfers Comments: Pt transferred from recliner >< bed with no device and Supervision. No IV to manage this date. Pt additionally transferred to/from lower couch in hallway with no device and Supervision. No cues for hand placement needed.   Bed mobility  Supine to Sit: Independent  Sit to Supine: Independent  Comment: flat hospital bed with no rails                          Cognition  Overall Cognitive Status: [facility]                                         Plan   Plan  Times per week: 3-5  Plan weeks: by discharge  Current Treatment Recommendations: Strengthening, Patient/Caregiver Education & Training, Equipment Evaluation, Education, & procurement, Balance Training, Functional Mobility Training, Endurance Training, Safety Education & Training, Self-Care / ADL                                                  AM-PAC Score        AM-PAC Inpatient Daily Activity Raw Score: 22 (11/17/20 8703)  AM-PAC Inpatient ADL T-Scale Score : 47.1 (11/17/20 1403)  ADL Inpatient CMS 0-100% Score: 25.8 (11/17/20 1403)  ADL Inpatient CMS G-Code Modifier : CJ (11/17/20 1403)    Goals  Short term goals  Time Frame for Short term goals: by discharge  Short term goal 1: Pt will complete self care indep  Short term goal 2: Pt will complete fx mobility indep  Short term goal 3: Pt will complete transfers indep  Short term goal 4: Pt will increase activity tolerance by standing for >5 min to complete ADL task. Patient Goals   Patient goals : \"to take care of myself and be able to make [place] cookies at home\"       Therapy Time   Individual Concurrent Group Co-treatment   Time In 1320         Time Out 1400         Minutes 40         Timed Code Treatment Minutes: 40 Minutes     Therapist was present, directed the patient's care, made skilled judgement, and was responsible for assessment and treatment of the patient.         KIM Coughlin, OTR/L  #[id number] 11/17/20 2:10 PM

## 2020-11-18 VITALS
SYSTOLIC BLOOD PRESSURE: 155 MMHG | HEIGHT: 60 IN | BODY MASS INDEX: 25.06 KG/M2 | WEIGHT: 127.65 LBS | DIASTOLIC BLOOD PRESSURE: 77 MMHG | OXYGEN SATURATION: 94 % | HEART RATE: 67 BPM | TEMPERATURE: 97.8 F | RESPIRATION RATE: 15 BRPM

## 2020-11-18 LAB
ANION GAP SERPL CALCULATED.3IONS-SCNC: 9 MMOL/L (ref 3–16)
BASOPHILS ABSOLUTE: 0.1 K/UL (ref 0–0.2)
BASOPHILS RELATIVE PERCENT: 0.9 %
BUN BLDV-MCNC: 11 MG/DL (ref 7–20)
CALCIUM SERPL-MCNC: 8.8 MG/DL (ref 8.3–10.6)
CHLORIDE BLD-SCNC: 99 MMOL/L (ref 99–110)
CO2: 30 MMOL/L (ref 21–32)
CREAT SERPL-MCNC: 0.6 MG/DL (ref 0.6–1.2)
EOSINOPHILS ABSOLUTE: 0.1 K/UL (ref 0–0.6)
EOSINOPHILS RELATIVE PERCENT: 2.2 %
GFR AFRICAN AMERICAN: >60
GFR NON-AFRICAN AMERICAN: >60
GLUCOSE BLD-MCNC: 257 MG/DL (ref 70–99)
HCT VFR BLD CALC: 34 % (ref 36–48)
HEMOGLOBIN: 11.4 G/DL (ref 12–16)
LYMPHOCYTES ABSOLUTE: 1.2 K/UL (ref 1–5.1)
LYMPHOCYTES RELATIVE PERCENT: 18.2 %
MCH RBC QN AUTO: 30.2 PG (ref 26–34)
MCHC RBC AUTO-ENTMCNC: 33.6 G/DL (ref 31–36)
MCV RBC AUTO: 90 FL (ref 80–100)
MONOCYTES ABSOLUTE: 0.7 K/UL (ref 0–1.3)
MONOCYTES RELATIVE PERCENT: 11.1 %
NEUTROPHILS ABSOLUTE: 4.4 K/UL (ref 1.7–7.7)
NEUTROPHILS RELATIVE PERCENT: 67.6 %
PDW BLD-RTO: 12.5 % (ref 12.4–15.4)
PLATELET # BLD: 229 K/UL (ref 135–450)
PMV BLD AUTO: 8.3 FL (ref 5–10.5)
POTASSIUM REFLEX MAGNESIUM: 4.3 MMOL/L (ref 3.5–5.1)
RBC # BLD: 3.78 M/UL (ref 4–5.2)
SODIUM BLD-SCNC: 138 MMOL/L (ref 136–145)
WBC # BLD: 6.5 K/UL (ref 4–11)

## 2020-11-18 PROCEDURE — 6370000000 HC RX 637 (ALT 250 FOR IP): Performed by: INTERNAL MEDICINE

## 2020-11-18 PROCEDURE — 6360000002 HC RX W HCPCS: Performed by: INTERNAL MEDICINE

## 2020-11-18 PROCEDURE — 2500000003 HC RX 250 WO HCPCS: Performed by: INTERNAL MEDICINE

## 2020-11-18 PROCEDURE — 2580000003 HC RX 258: Performed by: INTERNAL MEDICINE

## 2020-11-18 PROCEDURE — 85025 COMPLETE CBC W/AUTO DIFF WBC: CPT

## 2020-11-18 PROCEDURE — 36415 COLL VENOUS BLD VENIPUNCTURE: CPT

## 2020-11-18 PROCEDURE — 80048 BASIC METABOLIC PNL TOTAL CA: CPT

## 2020-11-18 PROCEDURE — 94760 N-INVAS EAR/PLS OXIMETRY 1: CPT

## 2020-11-18 PROCEDURE — 97530 THERAPEUTIC ACTIVITIES: CPT

## 2020-11-18 PROCEDURE — 97535 SELF CARE MNGMENT TRAINING: CPT

## 2020-11-18 RX ADMIN — METRONIDAZOLE 500 MG: 500 INJECTION, SOLUTION INTRAVENOUS at 05:11

## 2020-11-18 RX ADMIN — ENOXAPARIN SODIUM 40 MG: 40 INJECTION SUBCUTANEOUS at 09:06

## 2020-11-18 RX ADMIN — CALCIUM 1000 MG: 500 TABLET ORAL at 09:06

## 2020-11-18 RX ADMIN — LEVOTHYROXINE SODIUM 100 MCG: 0.1 TABLET ORAL at 05:11

## 2020-11-18 RX ADMIN — Medication 10 ML: at 09:07

## 2020-11-18 RX ADMIN — METOPROLOL TARTRATE 100 MG: 50 TABLET, FILM COATED ORAL at 09:06

## 2020-11-18 ASSESSMENT — PAIN SCALES - GENERAL: PAINLEVEL_OUTOF10: 0

## 2020-11-18 NOTE — PROGRESS NOTES
Pt discharged to home. Transportation here with wheelchair. Accompanied by spouse. Transported in personal vehicle. Discharge instructions and personal belongings given to pt. Explanation of discharge medications and instructions understood by verbal statement. No questions, comments or concerns at this time.  Electronically signed by Jayce Iyer RN on 11/18/2020

## 2020-11-18 NOTE — PLAN OF CARE
Problem: Falls - Risk of:  Goal: Will remain free from falls  Description: Will remain free from falls  Outcome: Ongoing  Goal: Absence of physical injury  Description: Absence of physical injury  Outcome: Ongoing     Problem: Nutrition  Goal: Optimal nutrition therapy  Outcome: Ongoing     Problem: Pain:  Goal: Pain level will decrease  Description: Pain level will decrease  Outcome: Ongoing  Goal: Control of acute pain  Description: Control of acute pain  Outcome: Ongoing  Goal: Control of chronic pain  Description: Control of chronic pain  Outcome: Ongoing     Problem: Skin Integrity:  Goal: Will show no infection signs and symptoms  Description: Will show no infection signs and symptoms  Outcome: Ongoing  Goal: Absence of new skin breakdown  Description: Absence of new skin breakdown  Outcome: Ongoing

## 2020-11-18 NOTE — PROGRESS NOTES
Patient A&O. Tolerating PO. Patient denies pain at this time. Call light within reach. Will continue to monitor and reassess.  Electronically signed by Tommie Trotter RN on 11/18/2020

## 2020-11-18 NOTE — PLAN OF CARE
Problem: Falls - Risk of:  Goal: Will remain free from falls  Description: Will remain free from falls  Outcome: Met This Shift  Note: Patient educated on fall prevention. Call light is within reach, bed locked in lowest position, personal items within reach, and bed alarm is on. Will round on patient per unit guidelines. Goal: Absence of physical injury  Description: Absence of physical injury  Outcome: Met This Shift  Note: Pt is free of injury. No injury noted. Fall precautions in place. Call light within reach. Will monitor. Problem: Nutrition  Goal: Optimal nutrition therapy  Outcome: Ongoing  Note: Patient educated on proper nutrition. Patients intake monitored and patient assessed to make sure no assistance with eating was required. Patient encouraged to eat a well balanced diet. Problem: Pain:  Goal: Pain level will decrease  Description: Pain level will decrease  Outcome: Ongoing  Note: Pain /discomfort being managed with PRN analgesics per MD orders, rest, repositioning. Patient able to express presence and absence of pain and rate pain appropriately using numerical scale. Goal: Control of acute pain  Description: Control of acute pain  Outcome: Ongoing  Note: No acute pain per this shift. Goal: Control of chronic pain  Description: Control of chronic pain  Outcome: Ongoing  Note: Pain /discomfort being managed with PRN analgesics per MD orders, rest, repositioning. Patient able to express presence and absence of pain and rate pain appropriately using numerical scale. Problem: Skin Integrity:  Goal: Will show no infection signs and symptoms  Description: Will show no infection signs and symptoms  Outcome: Ongoing  Note: Pt assessed for infection, No signs or symptoms of surgical site noted. VVS, WBC being monitored.  Reviewed information with pt and family, pt verbalized understanding     Goal: Absence of new skin breakdown  Description: Absence of new skin breakdown  Outcome: Ongoing  Note: Stanislaw score assessed. Patient able to ambulate and turn self. Repositioned patient Q2H and assessed skin. Educated patient on importance of repositioning to prevent skin issues.

## 2020-11-18 NOTE — PROGRESS NOTES
Occupational Therapy  Facility/Department: [address] ORTHOPEDICS  Daily Treatment Note  NAME: Steve Jeter  : 1940  MRN: [id number]    Date of Service: 2020    Discharge Recommendations:  Home with assist PRN       Assessment   Discussed w/OTR; pt agreeable to therapy. Pt supine <>sit independently w/ elevated bed height and bed rails. Pt sit <>stand independnetly. Pt func mob w/o device w/SBA to bathroom. Pt completed grooming standing at sink independently. Toilet transfer SBA w/grab bars. Pt experienced one instance of LOB during func mob and corrected self quickly and effectively. Pt stand <>sit to recliner chair independently. Pt positioned in chair w/safety alarms in place. Reccomend safe d/c home w/ assist PRN for safe particiaption of ADL tasks and mobility. Steve Jeter scored a 23/24 on the AM-PAC ADL Inpatient form. Current research shows that an AM-PAC score of 18 or greater is typically associated with a discharge to the patient's home setting. Based on the patient's AM-PAC score, and their current ADL deficits, it is recommended that the patient have 2-3 sessions per week of Occupational Therapy at d/c to increase the patient's independence. At this time, this patient demonstrates the endurance and safety to discharge home. Please see assessment section for further patient specific details. If patient discharges prior to next session this note will serve as a discharge summary. Please see below for the latest assessment towards goals. Patient Diagnosis(es): The primary encounter diagnosis was Septicemia ([facility] [address] Diagnoses of Urinary tract infection without hematuria, site unspecified, Acute diverticulitis, Cecum mass, Altered mental status, unspecified altered mental status type, Elevated procalcitonin, Non-intractable vomiting with nausea, unspecified vomiting type, Dehydration, Hypokalemia, Hypomagnesemia, and Hyperglycemia were also pertinent to this visit. has a past medical history of Hyperlipidemia, Hypertension, Incarcerated hernia, Thyroid disease, and Wears partial dentures. has a past surgical history that includes shoulder surgery (Left, 2008); Leg Surgery (Right); Hardware Removal (Right); and Inguinal hernia repair (Left, 1-22-14). Restrictions  Restrictions/Precautions  Restrictions/Precautions: Fall Risk  Position Activity Restriction  Other position/activity restrictions: IV in L wrist  Subjective   General  Chart Reviewed: Yes, Progress Notes  Additional Pertinent Hx: Jay Prince is a 78 y.o. female with PMH significant for HLD, HTN, thyroid disease, and incarcerated hernia who presents to the emergency department today complaining of abdominal pain with associated vomiting and fever. Onset was today. Patient is a very bad historian, and family is the primary historian. They advised the patient has mild dementia, but around 1700 hrs. this evening patient became much more disoriented. She was unable to make it to the bathroom and was complaining of back pain. When she finally did make it to the toilet, she was too weak to get off of the toilet. She was tested for Covid last Friday and was negative. They also report that patient had a colonoscopy on Wednesday by Dr. 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 (copied per Jamar Malin, APRN 11/13)  Family / Caregiver Present: No  Referring Practitioner: Angelo Cannon  Diagnosis: complicated UTI  Subjective  Subjective: Pt met bedside, agreeable to OT, no c/o of pain. Orientation  Orientation  Overall Orientation Status: Within Functional Limits  Objective    ADL  Grooming: Independent  LE Dressing: Independent  Toileting: Stand by assistance  Additional Comments: Grooming completed at sink independent, pt toileting SBA. Pt nicho brief and socks independently.         Balance  Standing Balance: Stand by assistance  Standing Balance  Time: 3 min  Activity: oral care at sink  Comment: Slight LOB during func mob to bathroom  Functional Mobility  Functional - Mobility Device: No device  Activity: To/from bathroom  Assist Level: Stand by assistance  Functional Mobility Comments: Pt completed functional mobility to and from bathroom w/o device w/SBA, one instance of slight LOB but corrected self independently. Toilet Transfers  Toilet - Technique: Ambulating  Equipment Used: Grab bars  Toilet Transfer: Stand by assistance  Toilet Transfers Comments: Pt ambulated with SBA and no device to the toilet w/use of grab bars for stand <>sit  Bed mobility  Supine to Sit: Independent  Scooting: Independent  Comment: Pt independent for supine <>sit w/elevated bed height and bed rails and to scoot to EOB. Transfers  Sit to stand: Independent  Stand to sit: Independent  Transfer Comments: Pt independent for sit <>stand from EOB and to recliner     Cognition  Overall Cognitive Status: [facility]  Cognition Comment: Pt stated she had a hard time with short term memory and recall            Assessment   Performance deficits / Impairments: Decreased functional mobility ; Decreased endurance;Decreased ADL status; Decreased balance  Assessment: Discussed w/OTr; pt agreeable to therapy. Pt supine <>sit independently w/ elevated bed height and bed rails. Pt sit <>stand independnetly. Pt func mob w/o device w/SBA to bathroom. Pt completed grooming standing at sink independently. Toilet transfer SBA w/grab bars. Pt experienced one instance of LOB during func mob and corrected self quickly and effectively. Pt stand <>sit to recliner chair independently. Pt positioned in chair w/safety alarms in place. Reccomend safe d/c home w/ assist PRN for safe particiaption of ADL tasks and mobility.   Prognosis: Good  OT Education: OT Role;Plan of Care;Transfer Training  Patient Education: Educated pt on safe transfer techniques  REQUIRES OT FOLLOW UP: Yes  Activity Tolerance  Activity Tolerance: Patient Tolerated treatment well  Activity Tolerance: Pt tolerated treatment well, no pain or fatigue noted. Pt eager to return home. Safety Devices  Safety Devices in place: Yes  Type of devices: Call light within reach; Chair alarm in place;Gait belt;Left in chair        Plan   Plan  Times per week: 3-5  Plan weeks: by discharge  Current Treatment Recommendations: Strengthening, Patient/Caregiver Education & Training, Equipment Evaluation, Education, & procurement, Balance Training, Functional Mobility Training, Endurance Training, Safety Education & Training, Self-Care / ADL    AM-PAC Score        AM-PAC Inpatient Daily Activity Raw Score: 23 (11/18/20 0825)  AM-PAC Inpatient ADL T-Scale Score : 51.12 (11/18/20 0825)  ADL Inpatient CMS 0-100% Score: 15.86 (11/18/20 0825)  ADL Inpatient CMS G-Code Modifier : CI (11/18/20 0825)    Goals  Short term goals  Time Frame for Short term goals: by discharge  Short term goal 1: Pt will complete self care indep  Short term goal 2: Pt will complete fx mobility indep  Short term goal 3: Pt will complete transfers indep  Short term goal 4: Pt will increase activity tolerance by standing for >5 min to complete ADL task.   Patient Goals   Patient goals : \"to take care of myself and be able to make [place] cookies at home\"       Therapy Time   Individual Concurrent Group Co-treatment   Time In [id number]         Time Out 0830         Minutes [address]/[place]  Electronically signed by Karla Pate on 11/18/2020 at 8:31 AM     Electronically signed by Teresita Hines, [id number] on 11/18/2020 at 8:34 AM      I attest that I was present for and made a skilled and mindful clinical judgement during the treatment of this patient 11/18/2020

## 2020-11-18 NOTE — PLAN OF CARE
Problem: Falls - Risk of:  Goal: Will remain free from falls  Description: Will remain free from falls  11/18/2020 0949 by Reyna Rebollar RN  Outcome: Ongoing  11/18/2020 0948 by Reyna Rebollar RN  Outcome: Ongoing  Goal: Absence of physical injury  Description: Absence of physical injury  11/18/2020 0949 by Reyna Rebollar RN  Outcome: Ongoing  11/18/2020 0948 by Reyna Rebollar RN  Outcome: Ongoing     Problem: Falls - Risk of:  Goal: Absence of physical injury  Description: Absence of physical injury  11/18/2020 0949 by Reyna Rebollar RN  Outcome: Ongoing  11/18/2020 0948 by Reyna Rebollar RN  Outcome: Ongoing     Problem: Nutrition  Goal: Optimal nutrition therapy  11/18/2020 0949 by Reyna Rebollar RN  Outcome: Ongoing  11/18/2020 0948 by Reyna Rebollar RN  Outcome: Ongoing     Problem: Pain:  Goal: Pain level will decrease  Description: Pain level will decrease  11/18/2020 0949 by Reyna Rebollar RN  Outcome: Ongoing  11/18/2020 0948 by Reyna Rebollar RN  Outcome: Ongoing  Goal: Control of acute pain  Description: Control of acute pain  11/18/2020 0949 by Reyna Rebollar RN  Outcome: Ongoing  11/18/2020 0948 by Reyna Rebollar RN  Outcome: Ongoing  Goal: Control of chronic pain  Description: Control of chronic pain  11/18/2020 0949 by Reyna Rebollar RN  Outcome: Ongoing  11/18/2020 0948 by Reyna Rebollar RN  Outcome: Ongoing     Problem: Skin Integrity:  Goal: Will show no infection signs and symptoms  Description: Will show no infection signs and symptoms  11/18/2020 0949 by Reyna Rebollar RN  Outcome: Ongoing  11/18/2020 0948 by Reyna Rebollar RN  Outcome: Ongoing  Goal: Absence of new skin breakdown  Description: Absence of new skin breakdown  11/18/2020 0949 by Reyna Rebollar RN  Outcome: Ongoing  11/18/2020 0948 by Reyna Rebollar RN  Outcome: Ongoing

## 2020-11-18 NOTE — PROGRESS NOTES
Hospitalist Progress Note      PCP: Radha Quintana    Date of Admission: 11/13/2020    Subjective: pt spiked a low grade last evening, has diarrhea    Medications:  Reviewed    Infusion Medications   Scheduled Medications    metroNIDAZOLE  500 mg Intravenous Q8H    calcium elemental  1,000 mg Oral Daily    levothyroxine  100 mcg Oral Daily    atorvastatin  20 mg Oral Nightly    sodium chloride flush  10 mL Intravenous 2 times per day    enoxaparin  40 mg Subcutaneous Daily    metoprolol tartrate  100 mg Oral BID    cefTRIAXone (ROCEPHIN) IV  2 g Intravenous Q24H     PRN Meds: sodium chloride flush, acetaminophen **OR** acetaminophen, promethazine **OR** ondansetron      Intake/Output Summary (Last 24 hours) at 11/17/2020 2225  Last data filed at 11/17/2020 1303  Gross per 24 hour   Intake 480 ml   Output --   Net 480 ml       Physical Exam Performed:    BP (!) 163/79   Pulse 78   Temp 98.6 °F (37 °C) (Oral)   Resp 16   Ht 5' (1.524 m)   Wt 127 lb 6.8 oz (57.8 kg) Comment: wt checked x3   SpO2 93%   BMI 24.89 kg/m²     General: Awake, oriented  HEENT: Pupils round, reacting to light  Heart: S1 S2 heard, no murmurs  Lung: Clear b/l  Abd: Soft, not distended, not tender, BS +  Extr: No cyanosis or clubbing  Neuro: No focal deficits. Labs:   Recent Labs     11/15/20  0437 11/16/20  0517 11/17/20  0518   WBC 8.4 8.8 5.9   HGB 11.4* 11.7* 11.3*   HCT 34.3* 34.7* 33.4*    180 198     Recent Labs     11/15/20  0437 11/16/20  0517 11/17/20  0518   * 128* 136   K 3.4* 3.7 3.7   CL 95* 95* 98*   CO2 30 22 29   BUN 12 9 11   CREATININE 0.7 0.5* 0.6   CALCIUM 8.4 8.2* 8.8     No results for input(s): AST, ALT, BILIDIR, BILITOT, ALKPHOS in the last 72 hours. No results for input(s): INR in the last 72 hours. No results for input(s): Nara Martinez in the last 72 hours.     Urinalysis:      Lab Results   Component Value Date    NITRU POSITIVE 11/13/2020    WBCUA 97 11/13/2020    BACTERIA 4+ 11/13/2020    RBCUA 1 11/13/2020    BLOODU SMALL 11/13/2020    SPECGRAV 1.025 11/13/2020    GLUCOSEU >=1000 11/13/2020       Radiology:  CT ABDOMEN PELVIS W IV CONTRAST Additional Contrast? None   Final Result   Addendum 1 of 1   ADDENDUM:   Report of outside CT dated 11/12/2020 has been subsequently obtained. Images   are not available for comparison. Cecal mass described on prior CT is not   well demonstrated on current CT as the cecum is decompressed and not   opacified. This could be further assessed with colonoscopy or short-term   interval follow-up exam with improved opacification of cecum. Final      XR CHEST PORTABLE   Final Result   No acute cardiopulmonary disease. Assessment/Plan:    Active Hospital Problems    Diagnosis    Complicated UTI (urinary tract infection) [N39.0]       - sepsis present on admission Tachy + WBC + source - UTI, pt still spiking a fever, monitor  - Complicated gram-negative bacterial urinary tract infection with e coli - cont IV abx  - suspected diverticulitis - will start flagyl, monitor for fevers  - bacteremia - blood cx positive for e coli, repeat blood cx sent  - Hypertension - controlled, cont home meds  - Hypothyroidism.  - Dyslipidemia.       Diet: DIET GENERAL;  Dietary Nutrition Supplements: Standard High Calorie Oral Supplement  Code Status: Full Code    PT/OT Eval Status: ordered    Dispo - cont care, hazel reynoso in am    Alexsander Gacria MD

## 2020-11-18 NOTE — PLAN OF CARE
Problem: Falls - Risk of:  Goal: Will remain free from falls  Description: Will remain free from falls  11/18/2020 1421 by Maryam Luther RN  Outcome: Completed     Problem: Falls - Risk of:  Goal: Absence of physical injury  Description: Absence of physical injury  11/18/2020 1421 by Maryam Luther RN  Outcome: Completed     Problem: Nutrition  Goal: Optimal nutrition therapy  11/18/2020 1421 by Maryam Luther RN  Outcome: Completed     Problem: Pain:  Goal: Pain level will decrease  Description: Pain level will decrease  11/18/2020 1421 by Maryam Luther RN  Outcome: Completed     Problem: Pain:  Goal: Control of acute pain  Description: Control of acute pain  11/18/2020 1421 by Maryam Luther RN  Outcome: Completed     Problem: Pain:  Goal: Control of chronic pain  Description: Control of chronic pain  11/18/2020 1421 by Maryam Luther RN  Outcome: Completed     Problem: Skin Integrity:  Goal: Will show no infection signs and symptoms  Description: Will show no infection signs and symptoms  11/18/2020 1421 by Maryam Luther RN  Outcome: Completed     Problem: Skin Integrity:  Goal: Absence of new skin breakdown  Description: Absence of new skin breakdown  11/18/2020 1421 by Maryam Luther RN  Outcome: Completed

## 2020-11-20 LAB
BLOOD CULTURE, ROUTINE: NORMAL
CULTURE, BLOOD 2: NORMAL

## 2020-12-02 NOTE — DISCHARGE SUMMARY
Pupils round, reacting to light  Heart: S1 S2 heard, no murmurs  Lung: Clear b/l  Abd: Soft, not distended, not tender, BS +  Extr: No cyanosis or clubbing  Neuro: No focal deficits. Labs: For convenience and continuity at follow-up the following most recent labs are provided:      CBC:    Lab Results   Component Value Date    WBC 6.5 11/18/2020    HGB 11.4 11/18/2020    HCT 34.0 11/18/2020     11/18/2020       Renal:    Lab Results   Component Value Date     11/18/2020    K 4.3 11/18/2020    CL 99 11/18/2020    CO2 30 11/18/2020    BUN 11 11/18/2020    CREATININE 0.6 11/18/2020    CALCIUM 8.8 11/18/2020    PHOS 2.5 12/02/2013         Significant Diagnostic Studies    Radiology:   CT ABDOMEN PELVIS W IV CONTRAST Additional Contrast? None   Final Result   Addendum 1 of 1   ADDENDUM:   Report of outside CT dated 11/12/2020 has been subsequently obtained. Images   are not available for comparison. Cecal mass described on prior CT is not   well demonstrated on current CT as the cecum is decompressed and not   opacified. This could be further assessed with colonoscopy or short-term   interval follow-up exam with improved opacification of cecum. Final      XR CHEST PORTABLE   Final Result   No acute cardiopulmonary disease.                 Consults:     IP CONSULT TO HOSPITALIST  IP CONSULT TO DIETITIAN    Disposition:  home     Condition at Discharge: Stable    Discharge Instructions/Follow-up:  PCP in 1 week    Code Status:  Prior     Activity: activity as tolerated    Diet: regular diet      Discharge Medications:     Discharge Medication List as of 11/18/2020  1:32 PM           Details   ciprofloxacin (CIPRO) 500 MG tablet Take 1 tablet by mouth 2 times daily for 10 days, Disp-20 tablet,R-0Normal      metroNIDAZOLE (FLAGYL) 500 MG tablet Take 1 tablet by mouth 3 times daily for 10 days, Disp-30 tablet,R-0Normal              Details   metoprolol (LOPRESSOR) 100 MG tablet Take 100 mg by mouth 2 times dailyHistorical Med      amLODIPine (NORVASC) 10 MG tablet Take 10 mg by mouth dailyHistorical Med      candesartan-hydrochlorothiazide (ATACAND HCT) 32-12.5 MG per tablet Take 1 tablet by mouth dailyHistorical Med      Lactobacillus Rhamnosus, GG, (CULTURELLE PO) Take 1 capsule by mouth daily Historical Med      Cholecalciferol (VITAMIN D-3) 25 MCG (1000 UT) CAPS Take 3,000 Units by mouth daily Takes 3tabs of 1000iu daily to = 3000 units Historical Med      alendronate (FOSAMAX) 70 MG tablet Take 70 mg by mouth once a week sundaysHistorical Med      levothyroxine (SYNTHROID) 100 MCG tablet Take 100 mcg by mouth Daily. Historical Med      simvastatin (ZOCOR) 40 MG tablet Take 40 mg by mouth nightly. Historical Med             Time Spent on discharge is more than 30 minutes in the examination, evaluation, counseling and review of medications and discharge plan. Signed:    Joanne Bai MD   12/2/2020      Thank you Christine Vasquez for the opportunity to be involved in this patient's care. If you have any questions or concerns please feel free to contact me at [phone].

## 2021-03-18 ENCOUNTER — HOSPITAL ENCOUNTER (OUTPATIENT)
Dept: WOMENS IMAGING | Age: 81
Discharge: HOME | End: 2021-03-18
Payer: MEDICARE

## 2021-03-18 DIAGNOSIS — Z12.31 VISIT FOR SCREENING MAMMOGRAM: ICD-10-CM

## 2021-03-18 PROCEDURE — 77063 BREAST TOMOSYNTHESIS BI: CPT

## 2022-04-25 ENCOUNTER — HOSPITAL ENCOUNTER (OUTPATIENT)
Dept: WOMENS IMAGING | Age: 82
Discharge: HOME | End: 2022-04-25
Payer: MEDICARE

## 2022-04-25 DIAGNOSIS — Z12.31 BREAST CANCER SCREENING BY MAMMOGRAM: ICD-10-CM

## 2022-04-25 PROCEDURE — 77067 SCR MAMMO BI INCL CAD: CPT

## 2022-12-05 ENCOUNTER — HOSPITAL ENCOUNTER (OUTPATIENT)
Age: 82
Setting detail: OUTPATIENT SURGERY
Discharge: HOME | End: 2022-12-05
Attending: INTERNAL MEDICINE | Admitting: INTERNAL MEDICINE
Payer: MEDICARE

## 2022-12-05 ENCOUNTER — ANESTHESIA EVENT (OUTPATIENT)
Dept: ENDOSCOPY | Age: 82
End: 2022-12-05
Payer: MEDICARE

## 2022-12-05 ENCOUNTER — APPOINTMENT (OUTPATIENT)
Dept: GENERAL RADIOLOGY | Age: 82
End: 2022-12-05
Attending: INTERNAL MEDICINE
Payer: MEDICARE

## 2022-12-05 ENCOUNTER — ANESTHESIA (OUTPATIENT)
Dept: ENDOSCOPY | Age: 82
End: 2022-12-05
Payer: MEDICARE

## 2022-12-05 VITALS
HEIGHT: 60 IN | RESPIRATION RATE: 16 BRPM | DIASTOLIC BLOOD PRESSURE: 63 MMHG | OXYGEN SATURATION: 97 % | WEIGHT: 128.5 LBS | TEMPERATURE: 98.9 F | SYSTOLIC BLOOD PRESSURE: 127 MMHG | BODY MASS INDEX: 25.23 KG/M2 | HEART RATE: 78 BPM

## 2022-12-05 PROCEDURE — 3609027000 HC COLONOSCOPY: Performed by: INTERNAL MEDICINE

## 2022-12-05 PROCEDURE — 6360000002 HC RX W HCPCS

## 2022-12-05 PROCEDURE — 2500000003 HC RX 250 WO HCPCS

## 2022-12-05 PROCEDURE — 2580000003 HC RX 258

## 2022-12-05 PROCEDURE — 7100000001 HC PACU RECOVERY - ADDTL 15 MIN: Performed by: INTERNAL MEDICINE

## 2022-12-05 PROCEDURE — 71045 X-RAY EXAM CHEST 1 VIEW: CPT

## 2022-12-05 PROCEDURE — 7100000000 HC PACU RECOVERY - FIRST 15 MIN: Performed by: INTERNAL MEDICINE

## 2022-12-05 PROCEDURE — 3700000000 HC ANESTHESIA ATTENDED CARE: Performed by: INTERNAL MEDICINE

## 2022-12-05 PROCEDURE — 7100000010 HC PHASE II RECOVERY - FIRST 15 MIN: Performed by: INTERNAL MEDICINE

## 2022-12-05 PROCEDURE — 2709999900 HC NON-CHARGEABLE SUPPLY: Performed by: INTERNAL MEDICINE

## 2022-12-05 PROCEDURE — 3700000001 HC ADD 15 MINUTES (ANESTHESIA): Performed by: INTERNAL MEDICINE

## 2022-12-05 PROCEDURE — 7100000011 HC PHASE II RECOVERY - ADDTL 15 MIN: Performed by: INTERNAL MEDICINE

## 2022-12-05 RX ORDER — LIDOCAINE HYDROCHLORIDE 20 MG/ML
INJECTION, SOLUTION EPIDURAL; INFILTRATION; INTRACAUDAL; PERINEURAL PRN
Status: DISCONTINUED | OUTPATIENT
Start: 2022-12-05 | End: 2022-12-05 | Stop reason: SDUPTHER

## 2022-12-05 RX ORDER — DIPHENHYDRAMINE HYDROCHLORIDE 50 MG/ML
12.5 INJECTION INTRAMUSCULAR; INTRAVENOUS
Status: CANCELLED | OUTPATIENT
Start: 2022-12-05 | End: 2022-12-06

## 2022-12-05 RX ORDER — PROPOFOL 10 MG/ML
INJECTION, EMULSION INTRAVENOUS CONTINUOUS PRN
Status: DISCONTINUED | OUTPATIENT
Start: 2022-12-05 | End: 2022-12-05 | Stop reason: SDUPTHER

## 2022-12-05 RX ORDER — SODIUM CHLORIDE 9 MG/ML
INJECTION, SOLUTION INTRAVENOUS PRN
Status: CANCELLED | OUTPATIENT
Start: 2022-12-05

## 2022-12-05 RX ORDER — ONDANSETRON 2 MG/ML
4 INJECTION INTRAMUSCULAR; INTRAVENOUS
Status: CANCELLED | OUTPATIENT
Start: 2022-12-05 | End: 2022-12-06

## 2022-12-05 RX ORDER — PROPOFOL 10 MG/ML
INJECTION, EMULSION INTRAVENOUS PRN
Status: DISCONTINUED | OUTPATIENT
Start: 2022-12-05 | End: 2022-12-05 | Stop reason: SDUPTHER

## 2022-12-05 RX ORDER — SODIUM CHLORIDE 0.9 % (FLUSH) 0.9 %
5-40 SYRINGE (ML) INJECTION PRN
Status: CANCELLED | OUTPATIENT
Start: 2022-12-05

## 2022-12-05 RX ORDER — SODIUM CHLORIDE 0.9 % (FLUSH) 0.9 %
5-40 SYRINGE (ML) INJECTION EVERY 12 HOURS SCHEDULED
Status: CANCELLED | OUTPATIENT
Start: 2022-12-05

## 2022-12-05 RX ORDER — SODIUM CHLORIDE 9 MG/ML
INJECTION, SOLUTION INTRAVENOUS CONTINUOUS PRN
Status: DISCONTINUED | OUTPATIENT
Start: 2022-12-05 | End: 2022-12-05 | Stop reason: SDUPTHER

## 2022-12-05 RX ADMIN — SODIUM CHLORIDE: 9 INJECTION, SOLUTION INTRAVENOUS at 10:21

## 2022-12-05 RX ADMIN — LIDOCAINE HYDROCHLORIDE 50 MG: 20 INJECTION, SOLUTION EPIDURAL; INFILTRATION; INTRACAUDAL; PERINEURAL at 10:26

## 2022-12-05 RX ADMIN — PROPOFOL 100 MCG/KG/MIN: 10 INJECTION, EMULSION INTRAVENOUS at 10:26

## 2022-12-05 RX ADMIN — PROPOFOL 70 MG: 10 INJECTION, EMULSION INTRAVENOUS at 10:26

## 2022-12-05 RX ADMIN — PROPOFOL 30 MG: 10 INJECTION, EMULSION INTRAVENOUS at 10:31

## 2022-12-05 ASSESSMENT — PAIN DESCRIPTION - PAIN TYPE
TYPE: ACUTE PAIN

## 2022-12-05 ASSESSMENT — LIFESTYLE VARIABLES: SMOKING_STATUS: 0

## 2022-12-05 ASSESSMENT — PAIN SCALES - GENERAL
PAINLEVEL_OUTOF10: 5

## 2022-12-05 ASSESSMENT — PAIN DESCRIPTION - FREQUENCY
FREQUENCY: INTERMITTENT
FREQUENCY: CONTINUOUS
FREQUENCY: INTERMITTENT

## 2022-12-05 ASSESSMENT — PAIN DESCRIPTION - ONSET
ONSET: ON-GOING
ONSET: ON-GOING

## 2022-12-05 ASSESSMENT — PAIN DESCRIPTION - DESCRIPTORS
DESCRIPTORS: SHARP
DESCRIPTORS: SHARP
DESCRIPTORS: ACHING;STABBING
DESCRIPTORS: ACHING;STABBING

## 2022-12-05 ASSESSMENT — PAIN DESCRIPTION - LOCATION
LOCATION: ABDOMEN

## 2022-12-05 ASSESSMENT — PAIN DESCRIPTION - ORIENTATION
ORIENTATION: RIGHT

## 2022-12-05 ASSESSMENT — PAIN - FUNCTIONAL ASSESSMENT
PAIN_FUNCTIONAL_ASSESSMENT: ACTIVITIES ARE NOT PREVENTED
PAIN_FUNCTIONAL_ASSESSMENT: PREVENTS OR INTERFERES SOME ACTIVE ACTIVITIES AND ADLS
PAIN_FUNCTIONAL_ASSESSMENT: ACTIVITIES ARE NOT PREVENTED
PAIN_FUNCTIONAL_ASSESSMENT: 0-10
PAIN_FUNCTIONAL_ASSESSMENT: PREVENTS OR INTERFERES SOME ACTIVE ACTIVITIES AND ADLS

## 2022-12-05 NOTE — DISCHARGE INSTRUCTIONS
Recommendations:   Recommend repeat colonoscopy in 3 years if remains in good health. Discharge Instructions for Colonoscopy     Colonoscopy is a visual exam of the lining of the large intestine, also called the bowel or colon, with a colonoscope. A colonoscope is a flexible tube with a light and a viewing device. It allows the doctor to view the inside of the colon through a tiny video camera. Colonoscopy is performed for many reasons: unexplained anemia , pain, diarrhea , bloody stools, cancer screening, among many other reasons. Complications from a colonoscopy are rare. Some possible serious complications include perforated bowel (which might require surgery) and bleeding (which could require blood transfusion ). Minor complications include bloating, gas, and cramping that can last for 1-2 days after the procedure. Because air is put into your colon during the procedure, it is normal to pass large amounts of air from your rectum. You may not have a bowel movement for 1-3 days after the procedure. What You Will Need:  Someone to drive you home after the procedure     Steps to Take:  [address] when you get home. Because the sedative will make you drowsy, don't drive, operate machinery, or make important decisions the day of the procedure. Feelings of bloating, gas, or cramping may persist for 24 hours. Diet -  Try sips of water first. If tolerated, resume bland food (scrambled eggs, toast, soup) first.  If tolerated, resume regular diet or the diet recommended by your physician. Do not drink alcohol for 24 hours. Physical Activity -  Ask your doctor when you will be able to return to work. Do not drive, operate heavy machinery, or do activities that require coordination or balance for 24 hours. Otherwise, return to your normal routine as soon as you are comfortable to do so, which is usually the next day after the procedure.    Medications - When taking medications, it's important to: Take your medication as directed, not more, not less, not at a different time. Do not stop taking them without consulting your healthcare provider. Don't share them with anyone else. Know what effects and side effects to expect, and report them to your healthcare provider. If you are taking more than one drug, even if it is an over-the-counter medication, herb, or dietary supplement, be sure to check with a physician or pharmacist about drug interactions. Plan ahead for refills so you don't run out. Lifestyle Changes - The results of your colonoscopy will determine if any lifestyle changes are necessary. Follow-up:  The doctor will usually give you a preliminary report after the medication wears off and you are more alert. The results from a biopsy can take as long as 1-2 weeks to be completed. Schedule a follow-up appointment as directed by your doctor. You should schedule a follow-up colonoscopy as recommended by your doctor. Call Your Doctor If Any of the Following Occurs:  Bleeding from your rectum; notify your doctor if you pass a teaspoonful or more of blood   Black, tarry stools   Severe abdominal pain   Hard, swollen abdomen   Signs of infection, including fever or chills   Inability to pass gas or stool   Coughing, shortness of breath, chest pain, severe nausea or vomiting     In case of an emergency, call 911 immediately.

## 2022-12-05 NOTE — PROGRESS NOTES
Patient complaining of sharp abdominal pain. Patient was complaining of this pain in preop. Dr. Orlin Campos made aware and requested to come to bedside to assess.        Electronically signed by Antonia Almendarez RN on 12/5/2022 at 11:12 AM

## 2022-12-05 NOTE — PROGRESS NOTES
Dr. Odalis Ramírez at bedside. No new orders. Per Dr. Odalis Ramírez okay to go home.  Ice applied      Electronically signed by Arelis Woodruff RN on 12/5/2022 at 11:18 AM

## 2022-12-05 NOTE — ANESTHESIA POSTPROCEDURE EVALUATION
Department of Anesthesiology  Postprocedure Note    Patient: Merly Virgen  MRN: [id number]  YOB: 1940  Date of evaluation: 12/5/2022      Procedure Summary     Date: 12/05/22 Room / Location: [address] [place] [address]    Anesthesia Start: 7934 Anesthesia Stop: 9400    Procedure: COLONOSCOPY Diagnosis:       Family history of colon cancer      (Family history of colon cancer [Z80.0])    Surgeons: Jessica Jaimes MD Responsible Provider: Rachel Oswald MD    Anesthesia Type: MAC ASA Status: 2          Anesthesia Type: No value filed.     Mook Phase I: Mook Score: 10    Mook Phase II:        Anesthesia Post Evaluation    Patient location during evaluation: PACU  Level of consciousness: awake and alert  Airway patency: patent  Nausea & Vomiting: no nausea and no vomiting  Complications: no  Cardiovascular status: blood pressure returned to baseline  Respiratory status: acceptable  Hydration status: euvolemic  Comments: Postoperative Anesthesia Note    Name:    Merly Virgen  MRN:      [id number]    Patient Vitals in the past 12 hrs:  12/05/22 1127, BP:127/63, Temp:98.9 °F (37.2 °C), Temp src:Temporal, Pulse:78, Resp:16, SpO2:97 %  12/05/22 1120, BP:128/70, Temp:97.9 °F (36.6 °C), Temp src:Temporal, Pulse:84, Resp:19, SpO2:97 %  12/05/22 1115, BP:(!) 154/65, Pulse:78, Resp:22, SpO2:98 %  12/05/22 1100, BP:(!) 129/59, Pulse:70, Resp:15, SpO2:100 %  12/05/22 1055, BP:119/67, Pulse:67, Resp:12, SpO2:98 %  12/05/22 1050, BP:121/63, Pulse:82, Resp:25, SpO2:98 %  12/05/22 1046, BP:(!) 113/59, Pulse:75, Resp:17, SpO2:99 %  12/05/22 1042, BP:116/65, Temp:97.9 °F (36.6 °C), Temp src:Temporal, Pulse:73, Resp:16  12/05/22 0810, BP:125/74, Temp:97.5 °F (36.4 °C), Temp src:Temporal, Pulse:84, Resp:16, SpO2:97 %, Height:5' (1.524 m), Weight:128 lb 8 oz (58.3 kg)     LABS:    CBC  Lab Results       Component                Value               Date/Time                  WBC 6.5                 11/18/2020 05:10 AM        HGB                      11.4 (L)            11/18/2020 05:10 AM        HCT                      34.0 (L)            11/18/2020 05:10 AM        PLT                      229                 11/18/2020 05:10 AM   RENAL  Lab Results       Component                Value               Date/Time                  NA                       138                 11/18/2020 05:10 AM        K                        4.3                 11/18/2020 05:10 AM        CL                       99                  11/18/2020 05:10 AM        CO2                      30                  11/18/2020 05:10 AM        BUN                      11                  11/18/2020 05:10 AM        CREATININE               0.6                 11/18/2020 05:10 AM        GLUCOSE                  257 (H)             11/18/2020 05:10 AM   COAGS  Lab Results       Component                Value               Date/Time                  PROTIME                  14.0 (H)            11/29/2013 05:25 AM        INR                      1.26 (H)            11/29/2013 05:25 AM     Intake & Output:  @99VGVK@    Nausea & Vomiting:  No    Level of Consciousness:  Awake    Pain Assessment:  Adequate analgesia    Anesthesia Complications:  No apparent anesthetic complications    SUMMARY      Vital signs stable  OK to discharge from Stage I post anesthesia care.   Care transferred from Anesthesiology department on discharge from perioperative area

## 2022-12-05 NOTE — ANESTHESIA PRE PROCEDURE
Department of Anesthesiology  Preprocedure Note       Name:  Eliza Ovalle   Age:  78 y.o.  :  1940                                          MRN:  [id number]         Date:  2022      Surgeon: Kendra Miles):  Barb York MD    Procedure: Procedure(s):  COLONOSCOPY    Medications prior to admission:   Prior to Admission medications    Medication Sig Start Date End Date Taking? Authorizing Provider   metoprolol (LOPRESSOR) 100 MG tablet Take 100 mg by mouth 2 times daily    Historical Provider, MD   amLODIPine (NORVASC) 10 MG tablet Take 10 mg by mouth daily    Historical Provider, MD   candesartan-hydrochlorothiazide (ATACAND HCT) 32-12.5 MG per tablet Take 1 tablet by mouth daily    Historical Provider, MD   Lactobacillus Rhamnosus, GG, (CULTURELLE PO) Take 1 capsule by mouth daily  20   Historical Provider, MD   Cholecalciferol (VITAMIN D-3) 25 MCG (1000 UT) CAPS Take 3,000 Units by mouth daily Takes 3tabs of 1000iu daily to = 3000 units   Patient not taking: Reported on 2022    Historical Provider, MD   alendronate (FOSAMAX) 70 MG tablet Take 70 mg by mouth once a week sundays    Historical Provider, MD   levothyroxine (SYNTHROID) 100 MCG tablet Take 100 mcg by mouth Daily. Historical Provider, MD   simvastatin (ZOCOR) 40 MG tablet Take 40 mg by mouth nightly. Historical Provider, MD       Current medications:    No current facility-administered medications for this encounter.        Allergies:  No Known Allergies    Problem List:    Patient Active Problem List   Diagnosis Code    Incarcerated inguinal hernia K40.30    Diverticulitis Z37.66    Complicated UTI (urinary tract infection) N39.0       Past Medical History:        Diagnosis Date    Hyperlipidemia     Hypertension     Incarcerated hernia     Thyroid disease     hypothyroid    Wears partial dentures     upper       Past Surgical History:        Procedure Laterality Date    HARDWARE REMOVAL Right     right leg    INGUINAL HERNIA REPAIR Left 1-22-14    Left incarcerated inguinal hernia repair with mesh and exparel     LEG SURGERY Right     alexis placement    SHOULDER SURGERY Left 2008    rotator cuff repair       Social History:    Social History     Tobacco Use    Smoking status: Never    Smokeless tobacco: Never   Substance Use Topics    Alcohol use: Not Currently     Comment: rarely                                Counseling given: Not Answered      Vital Signs (Current):   Vitals:    12/05/22 0810   BP: 125/74   Pulse: 84   Resp: 16   Temp: 97.5 °F (36.4 °C)   TempSrc: Temporal   SpO2: 97%   Weight: 128 lb 8 oz (58.3 kg)   Height: 5' (1.524 m)                                              BP Readings from Last 3 Encounters:   12/05/22 125/74   11/18/20 (!) 155/77   02/04/14 140/90       NPO Status: Time of last liquid consumption: 0500                        Time of last solid consumption: 0800                        Date of last liquid consumption: 12/05/22                        Date of last solid food consumption: 12/04/22    BMI:   Wt Readings from Last 3 Encounters:   12/05/22 128 lb 8 oz (58.3 kg)   11/18/20 127 lb 10.3 oz (57.9 kg)   02/04/14 133 lb 9.6 oz (60.6 kg)     Body mass index is 25.1 kg/m².     CBC:   Lab Results   Component Value Date/Time    WBC 6.5 11/18/2020 05:10 AM    RBC 3.78 11/18/2020 05:10 AM    HGB 11.4 11/18/2020 05:10 AM    HCT 34.0 11/18/2020 05:10 AM    MCV 90.0 11/18/2020 05:10 AM    RDW 12.5 11/18/2020 05:10 AM     11/18/2020 05:10 AM       CMP:   Lab Results   Component Value Date/Time     11/18/2020 05:10 AM    K 4.3 11/18/2020 05:10 AM    CL 99 11/18/2020 05:10 AM    CO2 30 11/18/2020 05:10 AM    BUN 11 11/18/2020 05:10 AM    CREATININE 0.6 11/18/2020 05:10 AM    GFRAA >60 11/18/2020 05:10 AM    AGRATIO 1.1 11/13/2020 07:50 PM    LABGLOM >60 11/18/2020 05:10 AM    GLUCOSE 257 11/18/2020 05:10 AM    PROT 6.6 11/13/2020 07:50 PM    CALCIUM 8.8 11/18/2020 05:10 AM BILITOT 0.6 11/13/2020 07:50 PM    ALKPHOS 111 11/13/2020 07:50 PM    AST 27 11/13/2020 07:50 PM    ALT 16 11/13/2020 07:50 PM       POC Tests: No results for input(s): POCGLU, POCNA, POCK, POCCL, POCBUN, POCHEMO, POCHCT in the last 72 hours. Coags:   Lab Results   Component Value Date/Time    PROTIME 14.0 11/29/2013 05:25 AM    INR 1.26 11/29/2013 05:25 AM       HCG (If Applicable): No results found for: PREGTESTUR, PREGSERUM, HCG, HCGQUANT     ABGs: No results found for: PHART, PO2ART, NAS8CBK, YQM8ZOR, BEART, C9QYCHAJ     Type & Screen (If Applicable):  No results found for: LABABO, LABRH    Drug/Infectious Status (If Applicable):  No results found for: HIV, HEPCAB    COVID-19 Screening (If Applicable):   Lab Results   Component Value Date/Time    COVID19 Not Detected 11/13/2020 08:39 PM           Anesthesia Evaluation  Patient summary reviewed no history of anesthetic complications:   Airway: Mallampati: II  TM distance: >3 FB   Neck ROM: full  Mouth opening: > = 3 FB   Dental:    (+) partials      Pulmonary:       (-) sleep apnea and not a current smoker                           Cardiovascular:    (+) hypertension:, hyperlipidemia    (-) past MI, CABG/stent and  angina       Beta Blocker:  Dose within 24 Hrs         Neuro/Psych:      (-) seizures and CVA           GI/Hepatic/Renal:        (-) liver disease and no renal disease       Endo/Other:                     Abdominal:             Vascular: negative vascular ROS. Other Findings: Right upper rib pain starting this AM          Anesthesia Plan      MAC     ASA 2       Induction: intravenous. Anesthetic plan and risks discussed with patient. Plan discussed with CRNA. This pre-anesthesia assessment may be used as a history and physical.    DOS STAFF ADDENDUM:    Pt seen and examined, chart reviewed (including anesthesia, drug and allergy history). No interval changes to history and physical examination.   Anesthetic plan, risks, benefits, alternatives, and personnel involved discussed with patient. Patient verbalized an understanding and agrees to proceed.       Judah Nunez MD  December 5, 2022  8:30 AM

## 2022-12-05 NOTE — PROGRESS NOTES
Pt arrived to phase 2 from PACU. Pt awake and alert. Abd soft.  Pt with c/o right abd pain, rates 5/10

## 2022-12-05 NOTE — PROGRESS NOTES
PACU Transfer Note    Vitals:    12/05/22 1120   BP: 128/70   Pulse: 84   Resp: 19   Temp: 97.9 °F (36.6 °C)   SpO2: 97%       In: 500 [I.V.:500]  Out: -     Pain assessment:  Pain in abdomen on right sign. Okay to discharge per Dr. Ivana Blair.    Pain Level: 5    Report given to Receiving unit RN.    12/5/2022 11:22 AM      Electronically signed by Nasir Andino RN on 12/5/2022 at 11:22 AM      Electronically signed by Nasir Andino RN on 12/5/2022 at 11:22 AM

## 2022-12-05 NOTE — OP NOTE
Colonoscopy Procedure Note      Patient: Enzo Kaplan  : 1940  Acct#:     Procedure: Colonoscopy    Date:  2022    Surgeon:  Sheeba Lindsay MD    Referring Physician:  Casie Barton    Previous Colonoscopy:  Yes  Date:    Greater than 3 years: NO    Preoperative Diagnosis:  1. Colon Cancer     Postoperative Diagnosis:  1. Healthy Ileocolonic Anastomosis 2. Moderate Left/Sigmoid Colon Diverticulosis     Consent:  The patient or their legal guardian has signed a consent, and is aware of the potential risks, benefits, alternatives, and potential complications of this procedure. These include, but are not limited to hemorrhage, bleeding, post procedural pain, perforation, phlebitis, aspiration, hypotension, hypoxia, cardiovascular events such as arryhthmia, and possibly death. Additionally, the possibility of missed colonic polyps and interval colon cancer was discussed in the consent. Anesthesia:  The patient was administered IV propofol per anesthesiology team.  Please see their operative records for full details. Procedure: An informed consent was obtained from the patient after explanation of indications, benefits, possible risks and complications of the procedure. The patient was then taken to the endoscopy suite, placed in the left lateral decubitus position, and the above IV anesthesia was administered. A digital rectal examination was performed and revealed negative without mass, lesions or tenderness. The Olympus video colonoscope was placed in the patient's rectum under digital direction and advanced to the anastomosis. The preparation was excellent. The terminal ileum was normal appearing. The scope was then withdrawn back through the cecum, ascending, transverse, descending, sigmoid colon, and rectum. Careful circumferential examination of the mucosa in these areas demonstrated:    A healthy appearing ileocolonic anastomosis.    The left/sigmoid colon had moderate diverticulosis. The scope was then withdrawn into the rectum and retroflexed. The retroflexed view of the anal verge and rectum demonstrate normal rectum. The scope was straightened, the colon was decompressed and the scope was withdrawn from the patient. The patient tolerated the procedure well and was taken to the PACU in good condition. Estimated Blood Loss (mL): None    Complications: None    Specimens: * No specimens in log *    Impression:  See post-procedure diagnoses. Recommendations:   Recommend repeat colonoscopy in 3 years if remains in good health.      Alisa Garcias, [address] [facility]  12/5/2022  [phone]

## 2022-12-05 NOTE — PROGRESS NOTES
Patient admitted to PACU # 13 from [facility] at [phone] post COLONOSCOPY per Dr. More Chisholm. Attached to PACU monitoring system and report received from anesthesia provider. Patient was reported to be hemodynamically stable during procedure. Patient drowsy on admission and complaining of 5/10 right sided abdomen/ pain. Abdomen soft.      Electronically signed by Shankar Mcallister RN on 12/5/2022 at 10:49 AM

## 2022-12-05 NOTE — H&P
Pre-operative History and Physical    Patient: Sabine Elam  : 1940  Acct#:     Intended Procedure:  Colonoscopy    HISTORY OF PRESENT ILLNESS:  The patient is a 78 y.o. female  who presents for/due to Colon Cancer       Past Medical History:        Diagnosis Date    Hyperlipidemia     Hypertension     Incarcerated hernia     Thyroid disease     hypothyroid    Wears partial dentures     upper     Past Surgical History:        Procedure Laterality Date    HARDWARE REMOVAL Right     right leg    INGUINAL HERNIA REPAIR Left 14    Left incarcerated inguinal hernia repair with mesh and exparel     LEG SURGERY Right     alexis placement    SHOULDER SURGERY Left     rotator cuff repair     Medications Prior to Admission:   Prior to Admission medications    Medication Sig Start Date End Date Taking? Authorizing Provider   metoprolol (LOPRESSOR) 100 MG tablet Take 100 mg by mouth 2 times daily    Historical Provider, MD   amLODIPine (NORVASC) 10 MG tablet Take 10 mg by mouth daily    Historical Provider, MD   candesartan-hydrochlorothiazide (ATACAND HCT) 32-12.5 MG per tablet Take 1 tablet by mouth daily    Historical Provider, MD   Lactobacillus Rhamnosus, GG, (CULTURELLE PO) Take 1 capsule by mouth daily  20   Historical Provider, MD   Cholecalciferol (VITAMIN D-3) 25 MCG (1000 UT) CAPS Take 3,000 Units by mouth daily Takes 3tabs of 1000iu daily to = 3000 units   Patient not taking: Reported on 2022    Historical Provider, MD   alendronate (FOSAMAX) 70 MG tablet Take 70 mg by mouth once a week sundays    Historical Provider, MD   levothyroxine (SYNTHROID) 100 MCG tablet Take 100 mcg by mouth Daily. Historical Provider, MD   simvastatin (ZOCOR) 40 MG tablet Take 40 mg by mouth nightly. Historical Provider, MD       Allergies:  Patient has no known allergies. Social History:   TOBACCO:   reports that she has never smoked.  She has never used smokeless tobacco.  ETOH:   reports that she does not currently use alcohol. DRUGS:   reports no history of drug use. PHYSICAL EXAM:      Vital Signs: /74   Pulse 84   Temp 97.5 °F (36.4 °C) (Temporal)   Resp 16   Ht 5' (1.524 m)   Wt 128 lb 8 oz (58.3 kg)   SpO2 97%   BMI 25.10 kg/m²    Airway: No stridor or wheezing noted. Good air movement  Pulmonary: without wheezes. Clear to auscultation  Cardiac:regular rate and rhythm without loud murmurs  Abdomen:soft, nontender,  Bowel sounds present    Pre-Procedure Assessment / Plan:  1) Colonoscopy    ASA Grade:  ASA 2 - Patient with mild systemic disease with no functional limitations  Mallampati Classification:  Class II    Level of Sedation Plan:Deep sedation    Post Procedure plan: Return to same level of care    I assessed the patient and find that the patient is in satisfactory condition to proceed with the planned procedure and sedation plan. I have explained the risk, benefits, and alternatives to the procedure; the patient understands and agrees to proceed.        Keny Chavis MD  12/5/2022

## 2022-12-05 NOTE — PROGRESS NOTES
Medications administered and monitored by CRNA, see anesthesia record.     Electronically signed by Kennedi Borges RN on 12/5/2022 at 10:26 AM

## 2023-01-06 ENCOUNTER — HOSPITAL ENCOUNTER (INPATIENT)
Age: 83
LOS: 2 days | Discharge: HOME HEALTH | DRG: 177 | End: 2023-01-09
Attending: PEDIATRICS | Admitting: PEDIATRICS
Payer: MEDICARE

## 2023-01-06 ENCOUNTER — APPOINTMENT (OUTPATIENT)
Dept: GENERAL RADIOLOGY | Age: 83
DRG: 177 | End: 2023-01-06
Payer: MEDICARE

## 2023-01-06 DIAGNOSIS — R41.0 DISORIENTATION: ICD-10-CM

## 2023-01-06 DIAGNOSIS — R09.02 HYPOXIA: ICD-10-CM

## 2023-01-06 DIAGNOSIS — U07.1 COVID-19: Primary | ICD-10-CM

## 2023-01-06 LAB
BASOPHILS ABSOLUTE: 0 K/UL (ref 0–0.2)
BASOPHILS RELATIVE PERCENT: 0.4 %
EOSINOPHILS ABSOLUTE: 0 K/UL (ref 0–0.6)
EOSINOPHILS RELATIVE PERCENT: 0.5 %
HCT VFR BLD CALC: 37.5 % (ref 36–48)
HEMOGLOBIN: 12.2 G/DL (ref 12–16)
LYMPHOCYTES ABSOLUTE: 1 K/UL (ref 1–5.1)
LYMPHOCYTES RELATIVE PERCENT: 10.8 %
MCH RBC QN AUTO: 29.3 PG (ref 26–34)
MCHC RBC AUTO-ENTMCNC: 32.5 G/DL (ref 31–36)
MCV RBC AUTO: 90.1 FL (ref 80–100)
MONOCYTES ABSOLUTE: 1.1 K/UL (ref 0–1.3)
MONOCYTES RELATIVE PERCENT: 12.4 %
NEUTROPHILS ABSOLUTE: 6.7 K/UL (ref 1.7–7.7)
NEUTROPHILS RELATIVE PERCENT: 75.9 %
PDW BLD-RTO: 12.9 % (ref 12.4–15.4)
PLATELET # BLD: 220 K/UL (ref 135–450)
PMV BLD AUTO: 8.1 FL (ref 5–10.5)
RBC # BLD: 4.16 M/UL (ref 4–5.2)
SARS-COV-2, NAAT: DETECTED
WBC # BLD: 8.8 K/UL (ref 4–11)

## 2023-01-06 PROCEDURE — 87804 INFLUENZA ASSAY W/OPTIC: CPT

## 2023-01-06 PROCEDURE — 71046 X-RAY EXAM CHEST 2 VIEWS: CPT

## 2023-01-06 PROCEDURE — 83690 ASSAY OF LIPASE: CPT

## 2023-01-06 PROCEDURE — 80053 COMPREHEN METABOLIC PANEL: CPT

## 2023-01-06 PROCEDURE — 36415 COLL VENOUS BLD VENIPUNCTURE: CPT

## 2023-01-06 PROCEDURE — 99285 EMERGENCY DEPT VISIT HI MDM: CPT

## 2023-01-06 PROCEDURE — 85025 COMPLETE CBC W/AUTO DIFF WBC: CPT

## 2023-01-06 PROCEDURE — 87635 SARS-COV-2 COVID-19 AMP PRB: CPT

## 2023-01-06 RX ORDER — 0.9 % SODIUM CHLORIDE 0.9 %
500 INTRAVENOUS SOLUTION INTRAVENOUS ONCE
Status: COMPLETED | OUTPATIENT
Start: 2023-01-06 | End: 2023-01-07

## 2023-01-06 ASSESSMENT — LIFESTYLE VARIABLES
HOW MANY STANDARD DRINKS CONTAINING ALCOHOL DO YOU HAVE ON A TYPICAL DAY: PATIENT DOES NOT DRINK
HOW OFTEN DO YOU HAVE A DRINK CONTAINING ALCOHOL: NEVER

## 2023-01-06 ASSESSMENT — PAIN - FUNCTIONAL ASSESSMENT: PAIN_FUNCTIONAL_ASSESSMENT: NONE - DENIES PAIN

## 2023-01-07 PROBLEM — U07.1 COVID-19: Status: ACTIVE | Noted: 2023-01-07

## 2023-01-07 LAB
A/G RATIO: 0.9 (ref 1.1–2.2)
ALBUMIN SERPL-MCNC: 3.6 G/DL (ref 3.4–5)
ALP BLD-CCNC: 60 U/L (ref 40–129)
ALT SERPL-CCNC: 33 U/L (ref 10–40)
ANION GAP SERPL CALCULATED.3IONS-SCNC: 11 MMOL/L (ref 3–16)
ANION GAP SERPL CALCULATED.3IONS-SCNC: 11 MMOL/L (ref 3–16)
AST SERPL-CCNC: 39 U/L (ref 15–37)
BILIRUB SERPL-MCNC: 0.3 MG/DL (ref 0–1)
BILIRUBIN URINE: NEGATIVE
BLOOD, URINE: NEGATIVE
BUN BLDV-MCNC: 16 MG/DL (ref 7–20)
BUN BLDV-MCNC: 18 MG/DL (ref 7–20)
CALCIUM SERPL-MCNC: 8.8 MG/DL (ref 8.3–10.6)
CALCIUM SERPL-MCNC: 9.1 MG/DL (ref 8.3–10.6)
CHLORIDE BLD-SCNC: 95 MMOL/L (ref 99–110)
CHLORIDE BLD-SCNC: 98 MMOL/L (ref 99–110)
CLARITY: CLEAR
CO2: 27 MMOL/L (ref 21–32)
CO2: 27 MMOL/L (ref 21–32)
COLOR: NORMAL
CREAT SERPL-MCNC: 0.6 MG/DL (ref 0.6–1.2)
CREAT SERPL-MCNC: 0.6 MG/DL (ref 0.6–1.2)
GFR SERPL CREATININE-BSD FRML MDRD: >60 ML/MIN/{1.73_M2}
GFR SERPL CREATININE-BSD FRML MDRD: >60 ML/MIN/{1.73_M2}
GLUCOSE BLD-MCNC: 132 MG/DL (ref 70–99)
GLUCOSE BLD-MCNC: 134 MG/DL (ref 70–99)
GLUCOSE BLD-MCNC: 143 MG/DL (ref 70–99)
GLUCOSE BLD-MCNC: 145 MG/DL (ref 70–99)
GLUCOSE BLD-MCNC: 152 MG/DL (ref 70–99)
GLUCOSE BLD-MCNC: 167 MG/DL (ref 70–99)
GLUCOSE URINE: NEGATIVE MG/DL
KETONES, URINE: NEGATIVE MG/DL
LEUKOCYTE ESTERASE, URINE: NEGATIVE
LIPASE: 20 U/L (ref 13–60)
MAGNESIUM: 1.9 MG/DL (ref 1.8–2.4)
MICROSCOPIC EXAMINATION: NORMAL
NITRITE, URINE: NEGATIVE
PERFORMED ON: ABNORMAL
PH UA: 6 (ref 5–8)
POTASSIUM REFLEX MAGNESIUM: 3.4 MMOL/L (ref 3.5–5.1)
POTASSIUM SERPL-SCNC: 5.1 MMOL/L (ref 3.5–5.1)
PROCALCITONIN: 0.1 NG/ML (ref 0–0.15)
PROTEIN UA: NEGATIVE MG/DL
RAPID INFLUENZA  B AGN: NEGATIVE
RAPID INFLUENZA A AGN: NEGATIVE
SODIUM BLD-SCNC: 133 MMOL/L (ref 136–145)
SODIUM BLD-SCNC: 136 MMOL/L (ref 136–145)
SPECIFIC GRAVITY UA: 1.01 (ref 1–1.03)
TOTAL PROTEIN: 7.5 G/DL (ref 6.4–8.2)
URINE REFLEX TO CULTURE: NORMAL
URINE TYPE: NORMAL
UROBILINOGEN, URINE: 0.2 E.U./DL

## 2023-01-07 PROCEDURE — 6370000000 HC RX 637 (ALT 250 FOR IP): Performed by: PEDIATRICS

## 2023-01-07 PROCEDURE — 2580000003 HC RX 258: Performed by: NURSE PRACTITIONER

## 2023-01-07 PROCEDURE — 6370000000 HC RX 637 (ALT 250 FOR IP): Performed by: NURSE PRACTITIONER

## 2023-01-07 PROCEDURE — 2700000000 HC OXYGEN THERAPY PER DAY

## 2023-01-07 PROCEDURE — 6370000000 HC RX 637 (ALT 250 FOR IP): Performed by: INTERNAL MEDICINE

## 2023-01-07 PROCEDURE — 80048 BASIC METABOLIC PNL TOTAL CA: CPT

## 2023-01-07 PROCEDURE — 84145 PROCALCITONIN (PCT): CPT

## 2023-01-07 PROCEDURE — 6360000002 HC RX W HCPCS: Performed by: NURSE PRACTITIONER

## 2023-01-07 PROCEDURE — 81003 URINALYSIS AUTO W/O SCOPE: CPT

## 2023-01-07 PROCEDURE — 94761 N-INVAS EAR/PLS OXIMETRY MLT: CPT

## 2023-01-07 PROCEDURE — 2580000003 HC RX 258: Performed by: FAMILY MEDICINE

## 2023-01-07 PROCEDURE — 94640 AIRWAY INHALATION TREATMENT: CPT

## 2023-01-07 PROCEDURE — 36415 COLL VENOUS BLD VENIPUNCTURE: CPT

## 2023-01-07 PROCEDURE — 94760 N-INVAS EAR/PLS OXIMETRY 1: CPT

## 2023-01-07 PROCEDURE — 1200000000 HC SEMI PRIVATE

## 2023-01-07 PROCEDURE — 83735 ASSAY OF MAGNESIUM: CPT

## 2023-01-07 RX ORDER — SODIUM CHLORIDE, SODIUM LACTATE, POTASSIUM CHLORIDE, CALCIUM CHLORIDE 600; 310; 30; 20 MG/100ML; MG/100ML; MG/100ML; MG/100ML
INJECTION, SOLUTION INTRAVENOUS CONTINUOUS
Status: DISCONTINUED | OUTPATIENT
Start: 2023-01-07 | End: 2023-01-08

## 2023-01-07 RX ORDER — ROSUVASTATIN CALCIUM 20 MG/1
20 TABLET, COATED ORAL DAILY
COMMUNITY

## 2023-01-07 RX ORDER — ONDANSETRON 4 MG/1
4 TABLET, ORALLY DISINTEGRATING ORAL EVERY 8 HOURS PRN
Status: DISCONTINUED | OUTPATIENT
Start: 2023-01-07 | End: 2023-01-09 | Stop reason: HOSPADM

## 2023-01-07 RX ORDER — SODIUM CHLORIDE 0.9 % (FLUSH) 0.9 %
5-40 SYRINGE (ML) INJECTION EVERY 12 HOURS SCHEDULED
Status: DISCONTINUED | OUTPATIENT
Start: 2023-01-07 | End: 2023-01-09 | Stop reason: HOSPADM

## 2023-01-07 RX ORDER — ONDANSETRON 2 MG/ML
4 INJECTION INTRAMUSCULAR; INTRAVENOUS EVERY 6 HOURS PRN
Status: DISCONTINUED | OUTPATIENT
Start: 2023-01-07 | End: 2023-01-09 | Stop reason: HOSPADM

## 2023-01-07 RX ORDER — AMLODIPINE BESYLATE 10 MG/1
10 TABLET ORAL DAILY
Status: DISCONTINUED | OUTPATIENT
Start: 2023-01-07 | End: 2023-01-09 | Stop reason: HOSPADM

## 2023-01-07 RX ORDER — INSULIN LISPRO 100 [IU]/ML
0-4 INJECTION, SOLUTION INTRAVENOUS; SUBCUTANEOUS NIGHTLY
Status: DISCONTINUED | OUTPATIENT
Start: 2023-01-07 | End: 2023-01-08

## 2023-01-07 RX ORDER — ACETAMINOPHEN 650 MG/1
650 SUPPOSITORY RECTAL EVERY 6 HOURS PRN
Status: DISCONTINUED | OUTPATIENT
Start: 2023-01-07 | End: 2023-01-09 | Stop reason: HOSPADM

## 2023-01-07 RX ORDER — GUAIFENESIN/DEXTROMETHORPHAN 100-10MG/5
10 SYRUP ORAL EVERY 6 HOURS PRN
Status: DISCONTINUED | OUTPATIENT
Start: 2023-01-07 | End: 2023-01-09 | Stop reason: HOSPADM

## 2023-01-07 RX ORDER — SODIUM CHLORIDE 0.9 % (FLUSH) 0.9 %
5-40 SYRINGE (ML) INJECTION PRN
Status: DISCONTINUED | OUTPATIENT
Start: 2023-01-07 | End: 2023-01-09 | Stop reason: HOSPADM

## 2023-01-07 RX ORDER — HYDROCHLOROTHIAZIDE 12.5 MG/1
12.5 CAPSULE, GELATIN COATED ORAL DAILY
Status: DISCONTINUED | OUTPATIENT
Start: 2023-01-07 | End: 2023-01-08

## 2023-01-07 RX ORDER — DEXTROSE MONOHYDRATE 100 MG/ML
INJECTION, SOLUTION INTRAVENOUS CONTINUOUS PRN
Status: DISCONTINUED | OUTPATIENT
Start: 2023-01-07 | End: 2023-01-08 | Stop reason: SDUPTHER

## 2023-01-07 RX ORDER — ENOXAPARIN SODIUM 100 MG/ML
40 INJECTION SUBCUTANEOUS DAILY
Status: DISCONTINUED | OUTPATIENT
Start: 2023-01-07 | End: 2023-01-09 | Stop reason: HOSPADM

## 2023-01-07 RX ORDER — ATORVASTATIN CALCIUM 20 MG/1
20 TABLET, FILM COATED ORAL DAILY
Status: DISCONTINUED | OUTPATIENT
Start: 2023-01-07 | End: 2023-01-07

## 2023-01-07 RX ORDER — CANDESARTAN CILEXETIL AND HYDROCHLOROTHIAZIDE 32; 12.5 MG/1; MG/1
1 TABLET ORAL DAILY
Status: DISCONTINUED | OUTPATIENT
Start: 2023-01-07 | End: 2023-01-07 | Stop reason: CLARIF

## 2023-01-07 RX ORDER — LEVOTHYROXINE SODIUM 0.1 MG/1
100 TABLET ORAL DAILY
Status: DISCONTINUED | OUTPATIENT
Start: 2023-01-07 | End: 2023-01-09 | Stop reason: HOSPADM

## 2023-01-07 RX ORDER — ACETAMINOPHEN 325 MG/1
650 TABLET ORAL EVERY 6 HOURS PRN
Status: DISCONTINUED | OUTPATIENT
Start: 2023-01-07 | End: 2023-01-09 | Stop reason: HOSPADM

## 2023-01-07 RX ORDER — POLYETHYLENE GLYCOL 3350 17 G/17G
17 POWDER, FOR SOLUTION ORAL DAILY PRN
Status: DISCONTINUED | OUTPATIENT
Start: 2023-01-07 | End: 2023-01-09 | Stop reason: HOSPADM

## 2023-01-07 RX ORDER — VALSARTAN 160 MG/1
320 TABLET ORAL DAILY
Status: DISCONTINUED | OUTPATIENT
Start: 2023-01-07 | End: 2023-01-09 | Stop reason: HOSPADM

## 2023-01-07 RX ORDER — IPRATROPIUM BROMIDE AND ALBUTEROL SULFATE 2.5; .5 MG/3ML; MG/3ML
1 SOLUTION RESPIRATORY (INHALATION) ONCE
Status: COMPLETED | OUTPATIENT
Start: 2023-01-07 | End: 2023-01-07

## 2023-01-07 RX ORDER — INSULIN LISPRO 100 [IU]/ML
0-4 INJECTION, SOLUTION INTRAVENOUS; SUBCUTANEOUS
Status: DISCONTINUED | OUTPATIENT
Start: 2023-01-07 | End: 2023-01-08

## 2023-01-07 RX ORDER — METOPROLOL TARTRATE 50 MG/1
100 TABLET, FILM COATED ORAL 2 TIMES DAILY
Status: DISCONTINUED | OUTPATIENT
Start: 2023-01-07 | End: 2023-01-09 | Stop reason: HOSPADM

## 2023-01-07 RX ORDER — ROSUVASTATIN CALCIUM 20 MG/1
20 TABLET, COATED ORAL NIGHTLY
Status: DISCONTINUED | OUTPATIENT
Start: 2023-01-07 | End: 2023-01-09 | Stop reason: HOSPADM

## 2023-01-07 RX ORDER — SODIUM CHLORIDE 9 MG/ML
INJECTION, SOLUTION INTRAVENOUS PRN
Status: DISCONTINUED | OUTPATIENT
Start: 2023-01-07 | End: 2023-01-09 | Stop reason: HOSPADM

## 2023-01-07 RX ADMIN — SODIUM CHLORIDE 500 ML: 9 INJECTION, SOLUTION INTRAVENOUS at 00:03

## 2023-01-07 RX ADMIN — LEVOTHYROXINE SODIUM 100 MCG: 0.1 TABLET ORAL at 06:14

## 2023-01-07 RX ADMIN — SODIUM CHLORIDE, PRESERVATIVE FREE 10 ML: 5 INJECTION INTRAVENOUS at 10:44

## 2023-01-07 RX ADMIN — Medication 10 ML: at 16:25

## 2023-01-07 RX ADMIN — SODIUM CHLORIDE, POTASSIUM CHLORIDE, SODIUM LACTATE AND CALCIUM CHLORIDE: 600; 310; 30; 20 INJECTION, SOLUTION INTRAVENOUS at 18:00

## 2023-01-07 RX ADMIN — METOPROLOL TARTRATE 100 MG: 50 TABLET ORAL at 20:07

## 2023-01-07 RX ADMIN — METOPROLOL TARTRATE 100 MG: 50 TABLET ORAL at 08:30

## 2023-01-07 RX ADMIN — VALSARTAN 320 MG: 160 TABLET ORAL at 08:30

## 2023-01-07 RX ADMIN — IPRATROPIUM BROMIDE AND ALBUTEROL SULFATE 1 AMPULE: .5; 2.5 SOLUTION RESPIRATORY (INHALATION) at 01:36

## 2023-01-07 RX ADMIN — Medication 1 LOZENGE: at 10:37

## 2023-01-07 RX ADMIN — Medication 1 LOZENGE: at 08:30

## 2023-01-07 RX ADMIN — Medication 10 ML: at 06:14

## 2023-01-07 RX ADMIN — ROSUVASTATIN CALCIUM 20 MG: 20 TABLET, FILM COATED ORAL at 20:07

## 2023-01-07 RX ADMIN — ENOXAPARIN SODIUM 40 MG: 100 INJECTION SUBCUTANEOUS at 08:31

## 2023-01-07 RX ADMIN — ACETAMINOPHEN 650 MG: 325 TABLET ORAL at 16:24

## 2023-01-07 RX ADMIN — AMLODIPINE BESYLATE 10 MG: 10 TABLET ORAL at 08:31

## 2023-01-07 RX ADMIN — ACETAMINOPHEN 650 MG: 325 TABLET ORAL at 22:29

## 2023-01-07 RX ADMIN — HYDROCHLOROTHIAZIDE 12.5 MG: 12.5 CAPSULE ORAL at 08:30

## 2023-01-07 ASSESSMENT — PAIN SCALES - GENERAL
PAINLEVEL_OUTOF10: 0
PAINLEVEL_OUTOF10: 3

## 2023-01-07 ASSESSMENT — PAIN DESCRIPTION - DESCRIPTORS: DESCRIPTORS: OTHER (COMMENT)

## 2023-01-07 ASSESSMENT — PAIN DESCRIPTION - LOCATION: LOCATION: HEAD

## 2023-01-07 ASSESSMENT — PAIN DESCRIPTION - ORIENTATION: ORIENTATION: OTHER (COMMENT)

## 2023-01-07 NOTE — PROGRESS NOTES
Pt S/E.  Pt was admitted overnight with cough, ams, fevers, found to be covid +.  H&P noted and agree with the plan  Labs noted    Cxr nonacute  Afebrile here   Dc when on room air

## 2023-01-07 NOTE — PROGRESS NOTES
Pt A/O x3-4, but very forgetful. 95% on RA, but reported to be hypoxic on exertion. Gown and pajamas placed. Call light within reach. Bed alarm not turned on. Pt's daughter approved by nursing supervisor to stay in the pt's room due to pt's memory issues. Pt's daughter claims that pt wanders and has been increasingly forgetful.

## 2023-01-07 NOTE — ED PROVIDER NOTES
[address]  [address] [address]  Dept: [phone]  Loc: [address] ENCOUNTER        This patient was not seen or evaluated by the attending physician. I evaluated this patient, the attending physician was available for consultation. CHIEF COMPLAINT    Chief Complaint   Patient presents with    Altered Mental Status     Patient came ambulatory to ed with family complaint of AMS. Patent family states she has been doing things differently around the house such as sleeping more, and redoing tasks such as refolding blankets that were folded before over and over. Patient is alert        HPI    Cydney Chavarria is a 78 y.o. female who presents to the emergency department with her spouse via self transportation with a complaint that the patient has gotten confused over the last 2 days. The  is reporting that on Thursday she slept the entire day and on Friday morning she woke up and developed a cough. He states at 1 point he checked her temperature and it was 101. He gave her Tylenol and he reports that she was sleeping a lot. He is also reporting that she has gotten confused and doing things that are abnormal such as repeating her household chores and repeating conversations. He states she is got confused like this 2 other times in the past where she had an episode of a urinary tract infection and the other time was from a diverticulitis. She has had her flu vaccine and COVID vaccines. The patient denies headache, shortness of breath, chest pain, nausea, vomiting or diarrhea. She denies urinary symptoms. REVIEW OF SYSTEMS    Cardiac: no chest pain, no palpitations, no syncope  Respiratory: see HPI, + cough  Neurologic: no syncope or LOC  GI: no vomiting, no diarrhea  General: + fever  All other systems reviewed and are negative.     PAST MEDICAL & SURGICAL HISTORY    Past Medical History:   Diagnosis Date    Hyperlipidemia     Hypertension     Incarcerated hernia     Thyroid disease     hypothyroid    Wears partial dentures     upper     Past Surgical History:   Procedure Laterality Date    COLONOSCOPY N/A 12/5/2022    COLONOSCOPY performed by Hernando Garcia MD at [address] Right     right leg    INGUINAL HERNIA REPAIR Left 1-22-14    Left incarcerated inguinal hernia repair with mesh and exparel     LEG SURGERY Right     alexis placement    SHOULDER SURGERY Left 2008    rotator cuff repair       CURRENT MEDICATIONS  (may include discharge medications prescribed in the ED)  REM      ALLERGIES    No Known Allergies    SOCIAL & FAMILY HISTORY    Social History     Socioeconomic History    Marital status:      Spouse name: None    Number of children: None    Years of education: None    Highest education level: None   Tobacco Use    Smoking status: Never    Smokeless tobacco: Never   Substance and Sexual Activity    Alcohol use: Not Currently     Comment: rarely    Drug use: No     History reviewed. No pertinent family history. PHYSICAL EXAM    VITAL SIGNS: /89   Pulse 94   Temp 99.4 °F (37.4 °C) (Oral)   Resp 16   Ht 5' (1.524 m)   Wt 132 lb 15 oz (60.3 kg)   SpO2 95%   BMI 25.96 kg/m²    Constitutional:  Well developed, well nourished, no acute distress   HENT:  Atraumatic, moist mucus membranes  Neck: supple, no JVD   Respiratory: Respirations are even and unlabored. Cough is wet, congested. Breath sounds clear throughout and diminished in the bases. No distress. No retractions. Speaking in full uninterrupted sentences. Cardiovascular: Regular rhythm and rate, S1-S2.   No murmur  Vascular: Radial and DP pulses 2+ and equal bilaterally  GI:  Soft, nontender, normal bowel sounds  Musculoskeletal:  no lower extremity edema, no lower extremity asymmetry, no calf tenderness, no thigh tenderness, no acute deformities  Integument:  Skin is warm and dry, no petechiae   Neurologic:  Alert & oriented, no slurred speech  Psych: Pleasant affect, no hallucinations      RADIOLOGY/PROCEDURES    XR CHEST (2 VW)   Final Result   Magnified cardiac silhouette. No radiographic evidence of acute pulmonary   abnormality seen. Overall, no significant changes from the prior study are   identified. Labs Reviewed   COVID-19, RAPID - Abnormal; Notable for the following components:       Result Value    SARS-CoV-2, NAAT DETECTED (*)     All other components within normal limits   COMPREHENSIVE METABOLIC PANEL - Abnormal; Notable for the following components:    Sodium 133 (*)     Chloride 95 (*)     Glucose 145 (*)     Albumin/Globulin Ratio 0.9 (*)     AST 39 (*)     All other components within normal limits   RAPID INFLUENZA A/B ANTIGENS   CBC WITH AUTO DIFFERENTIAL   LIPASE   URINALYSIS WITH REFLEX TO CULTURE   BASIC METABOLIC PANEL W/ REFLEX TO MG FOR LOW K   PROCALCITONIN       ED COURSE & MEDICAL DECISION MAKING    Pertinent Labs & Imaging studies reviewed and interpreted. (See chart for details)    See chart for details of medications given during the ED stay.     Vitals:    01/07/23 0045 01/07/23 0124 01/07/23 0137 01/07/23 0230   BP: 130/69   133/89   Pulse: 83 83 81 94   Resp: 21 16 15 16   Temp:    99.4 °F (37.4 °C)   TempSrc:    Oral   SpO2: 95% 91% 100% 95%   Weight:       Height:         Medications   amLODIPine (NORVASC) tablet 10 mg (has no administration in time range)   levothyroxine (SYNTHROID) tablet 100 mcg (has no administration in time range)   metoprolol tartrate (LOPRESSOR) tablet 100 mg (has no administration in time range)   sodium chloride flush 0.9 % injection 5-40 mL (has no administration in time range)   sodium chloride flush 0.9 % injection 5-40 mL (has no administration in time range)   0.9 % sodium chloride infusion (has no administration in time range)   enoxaparin (LOVENOX) injection 40 mg (has no administration in time range)   ondansetron (ZOFRAN-ODT) disintegrating tablet 4 mg (has no administration in time range)     Or   ondansetron (ZOFRAN) injection 4 mg (has no administration in time range)   polyethylene glycol (GLYCOLAX) packet 17 g (has no administration in time range)   acetaminophen (TYLENOL) tablet 650 mg (has no administration in time range)     Or   acetaminophen (TYLENOL) suppository 650 mg (has no administration in time range)   valsartan (DIOVAN) tablet 320 mg (has no administration in time range)     And   hydroCHLOROthiazide (MICROZIDE) capsule 12.5 mg (has no administration in time range)   rosuvastatin (CRESTOR) tablet 20 mg (has no administration in time range)   0.9 % sodium chloride bolus (0 mLs IntraVENous Stopped 1/7/23 0124)   ipratropium-albuterol (DUONEB) nebulizer solution 1 ampule (1 ampule Inhalation Given 1/7/23 0136)     I have seen and evaluated this patient. My attending physician was available for consultation. Differential diagnosis includes was not limited to urinary tract infection, dehydration, electrolyte derangement, anemia, influenza, COVID, pneumonia, bronchitis, asthma, heart failure, PE, URI, other    She is nontoxic in appearance and hemodynamically stable. No respiratory distress. She has been sleeping most of the day for the last 2 days. This morning she woke up with a cough and she had a temperature as high as 101 today at home. She was given some Tylenol. The  states that she is confused and this is not her normal.    That she is answering my orientation questions appropriately but she does have some periods of confusion with just regular conversation. I reevaluated her multiple times and had multiple conversations with her and she is demonstrating some agitation and anger. I asked the the  if she has a history of dementia and he said no not really.     She has a CBC of 8.8 with an H&H of 12.2 and 37.5    Sodium 133, potassium 5.1    BUN 18, creatinine 0.6    Serum glucose 145    Lipase 20    Urinalysis is negative for nitrites and leukocytes    Influenza is negative. She is positive for COVID    Chest x-ray interpreted by radiology and reviewed by myself. I agree with the interpretation. No acute cardiopulmonary process noted. Impression   Magnified cardiac silhouette. No radiographic evidence of acute pulmonary   abnormality seen. Overall, no significant changes from the prior study are   identified. The patient was not hypoxic or in respiratory distress however she ambulated down the hallway to the bathroom and after she ambulated back to her room we placed her back on the monitor and she was around 86 to 89% on room air. O2 at 2 L per nasal cannula was placed. She was not in respiratory distress. And her oxygen level went back up into the 90s just after 2 minutes of being placed on oxygen. She does have a congested cough and for this reason I ordered her 1 DuoNeb treatment. She does not wear oxygen at home. She has no history of lung disease. I discussed starting her on Paxil bed as I think she would be a good candidate for this however the patient's daughter is checking with a family member who is a physician before they agree to starting her on this medication. In the meantime I had a lengthy conversation with the patient and her spouse regarding CODE STATUS and she will remain a full code during this hospitalization. The patient and the  and the daughter agree with the hospitalization. Perfect serve to the hospitalist service for admission. The patient will be admitted to Dr. Dsouza Head service. FINAL IMPRESSION    1. COVID-19    2. Hypoxia    3.  Disorientation        PLAN  Admission to the hospital    (Please note that this note was completed with a voice recognition program.  Every attempt was made to edit the dictations, but inevitably there remain words that are mis-transcribed.)        PINKY Alves - CNP  01/07/23 0426

## 2023-01-07 NOTE — PLAN OF CARE
Problem: Discharge Planning  Goal: Discharge to home or other facility with appropriate resources  1/7/2023 1123 by Teresa Valentine RN  Outcome: Progressing  1/7/2023 0321 by Madhuri Orourke RN  Outcome: Progressing     Problem: Safety - Adult  Goal: Free from fall injury  1/7/2023 1123 by Teresa Valentine RN  Outcome: Progressing  1/7/2023 0321 by Madhuri Orourke RN  Outcome: Progressing     Problem: ABCDS Injury Assessment  Goal: Absence of physical injury  1/7/2023 1123 by Teresa Valentine RN  Outcome: Progressing  1/7/2023 0321 by Madhuri Orourke RN  Outcome: Progressing     Problem: Neurosensory - Adult  Goal: Achieves stable or improved neurological status  1/7/2023 1123 by Teresa Valentine RN  Outcome: Progressing  1/7/2023 0321 by Madhuri Orourke RN  Outcome: Progressing  Goal: Absence of seizures  1/7/2023 1123 by Teresa Valentine RN  Outcome: Progressing  1/7/2023 0321 by Madhuri Orourke RN  Outcome: Progressing  Goal: Remains free of injury related to seizures activity  1/7/2023 1123 by Teresa Valentine RN  Outcome: Progressing  1/7/2023 0321 by Madhuri Orourke RN  Outcome: Progressing  Goal: Achieves maximal functionality and self care  1/7/2023 1123 by Teresa Valentine RN  Outcome: Progressing  1/7/2023 0321 by Madhuri Orourke RN  Outcome: Progressing     Problem: Respiratory - Adult  Goal: Achieves optimal ventilation and oxygenation  1/7/2023 1123 by Teresa Valentine RN  Outcome: Progressing  1/7/2023 0321 by Madhuri Orourke RN  Outcome: Progressing     Problem: Infection - Adult  Goal: Absence of infection at discharge  1/7/2023 1123 by Teresa Valentine RN  Outcome: Progressing  1/7/2023 0321 by Madhuri Orourke RN  Outcome: Progressing  Goal: Absence of infection during hospitalization  1/7/2023 1123 by Teresa Valentine RN  Outcome: Progressing  1/7/2023 0321 by Madhuri Orourke RN  Outcome: Progressing  Goal: Absence of fever/infection during anticipated neutropenic period  1/7/2023 1123 by Alfonzo Ralph RN  Outcome: Progressing  1/7/2023 0321 by Myrna Sanders RN  Outcome: Progressing     Problem: Confusion  Goal: Confusion, delirium, dementia, or psychosis is improved or at baseline  Description: INTERVENTIONS:  1. Assess for possible contributors to thought disturbance, including medications, impaired vision or hearing, underlying metabolic abnormalities, dehydration, psychiatric diagnoses, and notify attending LIP  2. [place] high risk fall precautions, as indicated  3. Provide frequent short contacts to provide reality reorientation, refocusing and direction  4. Decrease environmental stimuli, including noise as appropriate  5. Monitor and intervene to maintain adequate nutrition, hydration, elimination, sleep and activity  6. If unable to ensure safety without constant attention obtain sitter and review sitter guidelines with assigned personnel  7. Initiate Psychosocial CNS and Spiritual Care consult, as indicated  Outcome: Progressing

## 2023-01-07 NOTE — PLAN OF CARE
Problem: Discharge Planning  Goal: Discharge to home or other facility with appropriate resources  Outcome: Progressing     Problem: Safety - Adult  Goal: Free from fall injury  Outcome: Progressing     Problem: ABCDS Injury Assessment  Goal: Absence of physical injury  Outcome: Progressing     Problem: Neurosensory - Adult  Goal: Achieves stable or improved neurological status  Outcome: Progressing  Goal: Absence of seizures  Outcome: Progressing  Goal: Remains free of injury related to seizures activity  Outcome: Progressing  Goal: Achieves maximal functionality and self care  Outcome: Progressing     Problem: Respiratory - Adult  Goal: Achieves optimal ventilation and oxygenation  Outcome: Progressing     Problem: Infection - Adult  Goal: Absence of infection at discharge  Outcome: Progressing  Goal: Absence of infection during hospitalization  Outcome: Progressing  Goal: Absence of fever/infection during anticipated neutropenic period  Outcome: Progressing

## 2023-01-07 NOTE — PROGRESS NOTES
Patient stating she is diabetic, takes Trulicity once a week at home. no orders for Blood sugar check. Doctor Notified, awaiting orders.  Electronically signed by Nasra Vinson RN on 1/7/2023 at 9:43 AM

## 2023-01-07 NOTE — ED NOTES
Patient taken to the bathroom with RN at this time. Upon return from the bathroom, patient O2 sat found to be 89% on room air. Patient advised at this time to take some deep breaths. O2 sats quickly leyla to 91-92%. Will continue to monitor.       Olga Lidia Chahal RN  01/07/23 0127

## 2023-01-07 NOTE — H&P
Hospital Medicine History & Physical      PCP: Geminigray Milad    Date of Admission: 1/6/2023    Date of Service: Pt seen/examined on 1/7/2023 and Admitted to Inpatient with expected LOS greater than two midnights due to medical therapy. Chief Complaint:  cough, confusion      History Of Present Illness:      78 y.o. female with PMHx of HLD, HTN and hypothyroidism presented to [facility] with cough.  reports that patient developed cough earlier today and throughout the day it has increased. She has been sleeping more and he noticed that she was confused and not acting herself. She developed a fever this morning but denies body aches. No nausea/vomiting. No change in appetite. Past Medical History:          Diagnosis Date    Hyperlipidemia     Hypertension     Incarcerated hernia     Thyroid disease     hypothyroid    Wears partial dentures     upper       Past Surgical History:          Procedure Laterality Date    COLONOSCOPY N/A 12/5/2022    COLONOSCOPY performed by Nila Grace MD at [address] Right     right leg    INGUINAL HERNIA REPAIR Left 1-22-14    Left incarcerated inguinal hernia repair with mesh and exparel     LEG SURGERY Right     alexis placement    SHOULDER SURGERY Left 2008    rotator cuff repair       Medications Prior to Admission:      Prior to Admission medications    Medication Sig Start Date End Date Taking?  Authorizing Provider   metoprolol (LOPRESSOR) 100 MG tablet Take 100 mg by mouth 2 times daily    Historical Provider, MD   amLODIPine (NORVASC) 10 MG tablet Take 10 mg by mouth daily    Historical Provider, MD   candesartan-hydrochlorothiazide (ATACAND HCT) 32-12.5 MG per tablet Take 1 tablet by mouth daily    Historical Provider, MD   Lactobacillus Rhamnosus, GG, (CULTURELLE PO) Take 1 capsule by mouth daily  11/13/20   Historical Provider, MD   Cholecalciferol (VITAMIN D-3) 25 MCG (1000 UT) CAPS Take 3,000 Units by mouth daily Takes 3tabs of 1000iu daily to = 3000 units   Patient not taking: Reported on 12/5/2022    Historical Provider, MD   alendronate (FOSAMAX) 70 MG tablet Take 70 mg by mouth once a week sundays    Historical Provider, MD   levothyroxine (SYNTHROID) 100 MCG tablet Take 100 mcg by mouth Daily. Historical Provider, MD   simvastatin (ZOCOR) 40 MG tablet Take 40 mg by mouth nightly. Historical Provider, MD       Allergies:  Patient has no known allergies. Social History:      The patient currently lives home with elderly     TOBACCO:   reports that she has never smoked. She has never used smokeless tobacco.  ETOH:   reports that she does not currently use alcohol. Family History:      Reviewed in detail positive as follows:    History reviewed. No pertinent family history. REVIEW OF SYSTEMS:   Pertinent positives as noted in the HPI. All other systems reviewed and negative. PHYSICAL EXAM PERFORMED:    /69   Pulse 81   Temp 98.8 °F (37.1 °C) (Oral)   Resp 15   Ht 5' (1.524 m)   Wt 132 lb 15 oz (60.3 kg)   SpO2 100%   BMI 25.96 kg/m²     General appearance:  Well developed, well nourished, elderly  female sitting upright on ED cart in no apparent distress, appears stated age and cooperative. HEENT:  Normal cephalic, atraumatic without obvious deformity. Pupils equal, round, and reactive to light. Conjunctivae/corneas clear. Neck: Supple, with full range of motion. No jugular venous distention. Trachea midline. Respiratory:  Normal respiratory effort. Clear to auscultation, bilaterally without accessory muscle use. Cardiovascular:  Regular rate and rhythm without murmurs, no lower extremity edema. Abdomen: Soft, non-tender, non-distended, without rebound or guarding. Normal bowel sounds. Musculoskeletal:  Moves all extremities equally. Full range of motion without deformity. Skin: Skin warm, dry and intact. No rashes or lesions.   Neurologic:  Neurovascularly intact without any focal sensory/motor deficits. Cranial nerves: II-XII intact, grossly non-focal.  Psychiatric:  Alert and oriented, thought content appropriate, normal insight  Capillary Refill: Brisk,< 3 seconds   Peripheral Pulses: +2 palpable, equal bilaterally       Labs:     Recent Labs     01/06/23  2330   WBC 8.8   HGB 12.2   HCT 37.5        Recent Labs     01/06/23  2330   *   K 5.1   CL 95*   CO2 27   BUN 18   CREATININE 0.6   CALCIUM 9.1     Recent Labs     01/06/23  2330   AST 39*   ALT 33   BILITOT 0.3   ALKPHOS 60     No results for input(s): INR in the last 72 hours. No results for input(s): Estle Carrow in the last 72 hours. Urinalysis:      Lab Results   Component Value Date/Time    NITRU Negative 01/07/2023 01:17 AM    WBCUA 97 11/13/2020 09:40 PM    BACTERIA 4+ 11/13/2020 09:40 PM    RBCUA 1 11/13/2020 09:40 PM    BLOODU Negative 01/07/2023 01:17 AM    SPECGRAV 1.015 01/07/2023 01:17 AM    GLUCOSEU Negative 01/07/2023 01:17 AM       Radiology:     XR CHEST (2 VW)   Final Result   Magnified cardiac silhouette. No radiographic evidence of acute pulmonary   abnormality seen. Overall, no significant changes from the prior study are   identified. ASSESSMENT:    Active Hospital Problems    Diagnosis Date Noted    COVID-19 [U07.1] 01/07/2023     Priority: Medium         PLAN:      Covid 19  - droplet plus isolation precautions  - Covid 19 swab + (1/6/2022)  - Oxygen titrate to keep saturation > 90%  - Chest xray: no radiographic evidence of acute pulmonary abnormality. - procal in am    Essential (primary) hypertension   - monitor blood pressure  - continue home meds     Hyperlipidemia   - continue statin    DVT Prophylaxis: Lovenox  Diet: Regular diet  Code Status: Full Code    Dispo - Inpatient       [address], APRN - CNP    Thank you Beverly Kingston for the opportunity to be involved in this patient's care.  If you have any questions or concerns please feel free to contact me at [phone].

## 2023-01-07 NOTE — PROGRESS NOTES
Physician Progress Note      PATIENTRosalba Hoffman  CSN #:                  [id number]  :                       1940  ADMIT DATE:       2023 10:39 PM  DISCH DATE:  RESPONDING  PROVIDER #:        Sera Glasgow DO          QUERY TEXT:    Pt admitted with Covid and AMS. If possible, please document in the progress   notes and discharge summary if you are evaluating and / or treating any of the   following: The medical record reflects the following:  Risk Factors: 78year old with + Covid   Na 133  Clinical Indicators: per ED Provider \"\"Patent family states she has been doing   things differently around the house such as sleeping more, and redoing tasks   such as refolding blankets that were folded before over and over.  Patient is   alert \" per NN \"Pt A/O x3-4, but very forgetful  Pt's daughter claims that pt   wanders and has been increasingly forgetful\"  Treatment:  Monitoring  Options provided:  -- Metabolic encephalopathy  -- Other - I will add my own diagnosis  -- Disagree - Not applicable / Not valid  -- Disagree - Clinically unable to determine / Unknown  -- Refer to Clinical Documentation Reviewer    PROVIDER RESPONSE TEXT:    Dementia, with acute delirium due to viral infection    Query created by: Naomi Bird on 2023 11:01 AM      Electronically signed by:  Edwin Saunders DO 2023 5:03 PM

## 2023-01-08 LAB
ANION GAP SERPL CALCULATED.3IONS-SCNC: 13 MMOL/L (ref 3–16)
BASOPHILS ABSOLUTE: 0.1 K/UL (ref 0–0.2)
BASOPHILS RELATIVE PERCENT: 0.6 %
BUN BLDV-MCNC: 13 MG/DL (ref 7–20)
CALCIUM SERPL-MCNC: 8.4 MG/DL (ref 8.3–10.6)
CHLORIDE BLD-SCNC: 90 MMOL/L (ref 99–110)
CO2: 26 MMOL/L (ref 21–32)
CREAT SERPL-MCNC: 0.6 MG/DL (ref 0.6–1.2)
EOSINOPHILS ABSOLUTE: 0 K/UL (ref 0–0.6)
EOSINOPHILS RELATIVE PERCENT: 0 %
ESTIMATED AVERAGE GLUCOSE: 139.9 MG/DL
GFR SERPL CREATININE-BSD FRML MDRD: >60 ML/MIN/{1.73_M2}
GLUCOSE BLD-MCNC: 165 MG/DL (ref 70–99)
GLUCOSE BLD-MCNC: 174 MG/DL (ref 70–99)
GLUCOSE BLD-MCNC: 194 MG/DL (ref 70–99)
GLUCOSE BLD-MCNC: 204 MG/DL (ref 70–99)
GLUCOSE BLD-MCNC: 213 MG/DL (ref 70–99)
GLUCOSE BLD-MCNC: 220 MG/DL (ref 70–99)
GLUCOSE BLD-MCNC: 275 MG/DL (ref 70–99)
GLUCOSE BLD-MCNC: 395 MG/DL (ref 70–99)
HBA1C MFR BLD: 6.5 %
HCT VFR BLD CALC: 34.4 % (ref 36–48)
HEMOGLOBIN: 11.2 G/DL (ref 12–16)
LYMPHOCYTES ABSOLUTE: 0.8 K/UL (ref 1–5.1)
LYMPHOCYTES RELATIVE PERCENT: 6.2 %
MCH RBC QN AUTO: 29 PG (ref 26–34)
MCHC RBC AUTO-ENTMCNC: 32.7 G/DL (ref 31–36)
MCV RBC AUTO: 88.8 FL (ref 80–100)
MONOCYTES ABSOLUTE: 0.8 K/UL (ref 0–1.3)
MONOCYTES RELATIVE PERCENT: 6.9 %
NEUTROPHILS ABSOLUTE: 10.4 K/UL (ref 1.7–7.7)
NEUTROPHILS RELATIVE PERCENT: 86.3 %
PDW BLD-RTO: 12.7 % (ref 12.4–15.4)
PERFORMED ON: ABNORMAL
PLATELET # BLD: 192 K/UL (ref 135–450)
PMV BLD AUTO: 7.9 FL (ref 5–10.5)
POTASSIUM REFLEX MAGNESIUM: 3.7 MMOL/L (ref 3.5–5.1)
RBC # BLD: 3.88 M/UL (ref 4–5.2)
SODIUM BLD-SCNC: 129 MMOL/L (ref 136–145)
WBC # BLD: 12 K/UL (ref 4–11)

## 2023-01-08 PROCEDURE — 94760 N-INVAS EAR/PLS OXIMETRY 1: CPT

## 2023-01-08 PROCEDURE — 6370000000 HC RX 637 (ALT 250 FOR IP): Performed by: INTERNAL MEDICINE

## 2023-01-08 PROCEDURE — 6360000002 HC RX W HCPCS: Performed by: NURSE PRACTITIONER

## 2023-01-08 PROCEDURE — 83036 HEMOGLOBIN GLYCOSYLATED A1C: CPT

## 2023-01-08 PROCEDURE — 85025 COMPLETE CBC W/AUTO DIFF WBC: CPT

## 2023-01-08 PROCEDURE — 6360000002 HC RX W HCPCS: Performed by: FAMILY MEDICINE

## 2023-01-08 PROCEDURE — 2580000003 HC RX 258: Performed by: NURSE PRACTITIONER

## 2023-01-08 PROCEDURE — 6370000000 HC RX 637 (ALT 250 FOR IP): Performed by: PEDIATRICS

## 2023-01-08 PROCEDURE — 1200000000 HC SEMI PRIVATE

## 2023-01-08 PROCEDURE — 36415 COLL VENOUS BLD VENIPUNCTURE: CPT

## 2023-01-08 PROCEDURE — 6370000000 HC RX 637 (ALT 250 FOR IP): Performed by: NURSE PRACTITIONER

## 2023-01-08 PROCEDURE — 6370000000 HC RX 637 (ALT 250 FOR IP): Performed by: FAMILY MEDICINE

## 2023-01-08 PROCEDURE — 81001 URINALYSIS AUTO W/SCOPE: CPT

## 2023-01-08 PROCEDURE — 80048 BASIC METABOLIC PNL TOTAL CA: CPT

## 2023-01-08 RX ORDER — DEXTROSE MONOHYDRATE 100 MG/ML
INJECTION, SOLUTION INTRAVENOUS CONTINUOUS PRN
Status: DISCONTINUED | OUTPATIENT
Start: 2023-01-08 | End: 2023-01-09 | Stop reason: HOSPADM

## 2023-01-08 RX ORDER — INSULIN LISPRO 100 [IU]/ML
0-16 INJECTION, SOLUTION INTRAVENOUS; SUBCUTANEOUS
Status: DISCONTINUED | OUTPATIENT
Start: 2023-01-08 | End: 2023-01-09 | Stop reason: HOSPADM

## 2023-01-08 RX ORDER — INSULIN LISPRO 100 [IU]/ML
0-4 INJECTION, SOLUTION INTRAVENOUS; SUBCUTANEOUS NIGHTLY
Status: DISCONTINUED | OUTPATIENT
Start: 2023-01-08 | End: 2023-01-09 | Stop reason: HOSPADM

## 2023-01-08 RX ORDER — FUROSEMIDE 10 MG/ML
20 INJECTION INTRAMUSCULAR; INTRAVENOUS ONCE
Status: COMPLETED | OUTPATIENT
Start: 2023-01-08 | End: 2023-01-08

## 2023-01-08 RX ADMIN — METOPROLOL TARTRATE 100 MG: 50 TABLET ORAL at 08:24

## 2023-01-08 RX ADMIN — LEVOTHYROXINE SODIUM 100 MCG: 0.1 TABLET ORAL at 06:45

## 2023-01-08 RX ADMIN — Medication 1 LOZENGE: at 08:38

## 2023-01-08 RX ADMIN — Medication 10 ML: at 20:06

## 2023-01-08 RX ADMIN — SODIUM CHLORIDE, PRESERVATIVE FREE 10 ML: 5 INJECTION INTRAVENOUS at 08:26

## 2023-01-08 RX ADMIN — Medication 1 LOZENGE: at 00:22

## 2023-01-08 RX ADMIN — METOPROLOL TARTRATE 100 MG: 50 TABLET ORAL at 20:06

## 2023-01-08 RX ADMIN — VALSARTAN 320 MG: 160 TABLET ORAL at 08:26

## 2023-01-08 RX ADMIN — ROSUVASTATIN CALCIUM 20 MG: 20 TABLET, FILM COATED ORAL at 20:06

## 2023-01-08 RX ADMIN — ONDANSETRON 4 MG: 2 INJECTION INTRAMUSCULAR; INTRAVENOUS at 14:21

## 2023-01-08 RX ADMIN — Medication 1 LOZENGE: at 20:06

## 2023-01-08 RX ADMIN — ACETAMINOPHEN 650 MG: 325 TABLET ORAL at 20:05

## 2023-01-08 RX ADMIN — FUROSEMIDE 20 MG: 10 INJECTION, SOLUTION INTRAMUSCULAR; INTRAVENOUS at 13:34

## 2023-01-08 RX ADMIN — ENOXAPARIN SODIUM 40 MG: 100 INJECTION SUBCUTANEOUS at 08:25

## 2023-01-08 RX ADMIN — AMLODIPINE BESYLATE 10 MG: 10 TABLET ORAL at 08:25

## 2023-01-08 RX ADMIN — INSULIN LISPRO 4 UNITS: 100 INJECTION, SOLUTION INTRAVENOUS; SUBCUTANEOUS at 17:58

## 2023-01-08 RX ADMIN — ACETAMINOPHEN 650 MG: 325 TABLET ORAL at 08:25

## 2023-01-08 RX ADMIN — HYDROCHLOROTHIAZIDE 12.5 MG: 12.5 CAPSULE ORAL at 08:25

## 2023-01-08 ASSESSMENT — PAIN SCALES - GENERAL
PAINLEVEL_OUTOF10: 0
PAINLEVEL_OUTOF10: 0

## 2023-01-08 NOTE — PLAN OF CARE
Problem: Discharge Planning  Goal: Discharge to home or other facility with appropriate resources  1/7/2023 2150 by Rayann Paget, RN  Outcome: Progressing       Problem: Safety - Adult  Goal: Free from fall injury  1/7/2023 2150 by Rayann Paget, RN  Outcome: Progressing    Problem: ABCDS Injury Assessment  Goal: Absence of physical injury  1/7/2023 2150 by Rayann Paget, RN  Outcome: Progressing    Problem: Neurosensory - Adult  Goal: Achieves stable or improved neurological status  1/7/2023 2150 by Rayann Paget, RN  Outcome: Progressing    Goal: Absence of seizures  1/7/2023 2150 by Rayann Paget, RN  Outcome: Progressing    Goal: Remains free of injury related to seizures activity  1/7/2023 2150 by Rayann Paget, RN  Outcome: Progressing    Goal: Achieves maximal functionality and self care  1/7/2023 2150 by Rayann Paget, RN  Outcome: Progressing

## 2023-01-08 NOTE — PLAN OF CARE
Problem: Discharge Planning  Goal: Discharge to home or other facility with appropriate resources  1/8/2023 1024 by Kelli Cancino RN  Outcome: Progressing  1/7/2023 2150 by Will Eid RN  Outcome: Progressing     Problem: Safety - Adult  Goal: Free from fall injury  1/8/2023 1024 by Kelli Cancino RN  Outcome: Progressing  1/7/2023 2150 by Will Eid RN  Outcome: Progressing     Problem: ABCDS Injury Assessment  Goal: Absence of physical injury  1/8/2023 1024 by Kelli Cancino RN  Outcome: Progressing  1/7/2023 2150 by Will Eid RN  Outcome: Progressing     Problem: Neurosensory - Adult  Goal: Achieves stable or improved neurological status  1/8/2023 1024 by Kelli Cancino RN  Outcome: Progressing  1/7/2023 2150 by Will Eid RN  Outcome: Progressing  Goal: Absence of seizures  1/8/2023 1024 by Kelli Cancino RN  Outcome: Progressing  1/7/2023 2150 by Will Eid RN  Outcome: Progressing  Goal: Remains free of injury related to seizures activity  1/8/2023 1024 by Kelli Cancino RN  Outcome: Progressing  1/7/2023 2150 by Will Eid RN  Outcome: Progressing  Goal: Achieves maximal functionality and self care  1/8/2023 1024 by Kelli Cancino RN  Outcome: Progressing  1/7/2023 2150 by Will Eid RN  Outcome: Progressing     Problem: Respiratory - Adult  Goal: Achieves optimal ventilation and oxygenation  1/8/2023 1024 by Kelli Cancino RN  Outcome: Progressing  1/7/2023 2150 by Will Eid RN  Outcome: Progressing     Problem: Infection - Adult  Goal: Absence of infection at discharge  1/8/2023 1024 by Kelli Cancino RN  Outcome: Progressing  1/7/2023 2150 by Will Eid RN  Outcome: Progressing  Goal: Absence of infection during hospitalization  1/8/2023 1024 by Kelli Cancino RN  Outcome: Progressing  1/7/2023 2150 by Will Eid RN  Outcome: Progressing  Goal: Absence of fever/infection during anticipated neutropenic period  1/8/2023 1024 by Eliot Munoz RN  Outcome: Progressing  1/7/2023 2150 by Lorri Crabtree RN  Outcome: Progressing     Problem: Confusion  Goal: Confusion, delirium, dementia, or psychosis is improved or at baseline  Description: INTERVENTIONS:  1. Assess for possible contributors to thought disturbance, including medications, impaired vision or hearing, underlying metabolic abnormalities, dehydration, psychiatric diagnoses, and notify attending LIP  2. [place] high risk fall precautions, as indicated  3. Provide frequent short contacts to provide reality reorientation, refocusing and direction  4. Decrease environmental stimuli, including noise as appropriate  5. Monitor and intervene to maintain adequate nutrition, hydration, elimination, sleep and activity  6. If unable to ensure safety without constant attention obtain sitter and review sitter guidelines with assigned personnel  7.  Initiate Psychosocial CNS and Spiritual Care consult, as indicated  1/8/2023 1024 by Eliot Munoz RN  Outcome: Progressing  1/7/2023 2150 by Lorri Crabtree RN  Outcome: Progressing

## 2023-01-08 NOTE — SIGNIFICANT EVENT
Called to bedside as RN concerned about patient's lung crackles on exam with IVF running. On exam patient presently without crackles on exam so I suspect she had rhonchi which she cleared with cough. She is awake and reports being able to drink ok. So will dc IVF for now ([email or web address]).      Micheline Mcfarland MD

## 2023-01-08 NOTE — PROGRESS NOTES
Pt's dinner time sugar 395. Pt covered with 4 units lispro, MD notified.  Electronically signed by Lia Riedel, RN on 1/8/2023 at 6:07 PM

## 2023-01-08 NOTE — PROGRESS NOTES
This RN called into pt's room. Pt found on toilet, more lethargic and weaker than usual. Vitals taken, vitals stable, bs taken, bs stable. Used stedy to transport pt back to bed. Pt states that she is more comfortable now. 3:40- Daughter called this RN into the room due to pt being confused. MD notified of both events. Repeat UA ordered.  Electronically signed by Rajendra Mcginnis RN on 1/8/2023 at 7:00 PM

## 2023-01-09 VITALS
WEIGHT: 140.87 LBS | HEART RATE: 71 BPM | TEMPERATURE: 98.1 F | DIASTOLIC BLOOD PRESSURE: 60 MMHG | SYSTOLIC BLOOD PRESSURE: 116 MMHG | RESPIRATION RATE: 17 BRPM | OXYGEN SATURATION: 96 % | BODY MASS INDEX: 27.66 KG/M2 | HEIGHT: 60 IN

## 2023-01-09 LAB
ANION GAP SERPL CALCULATED.3IONS-SCNC: 13 MMOL/L (ref 3–16)
BACTERIA: ABNORMAL /HPF
BASOPHILS ABSOLUTE: 0 K/UL (ref 0–0.2)
BASOPHILS RELATIVE PERCENT: 0.3 %
BILIRUBIN URINE: NEGATIVE
BLOOD, URINE: NEGATIVE
BUN BLDV-MCNC: 22 MG/DL (ref 7–20)
CALCIUM SERPL-MCNC: 8.5 MG/DL (ref 8.3–10.6)
CHLORIDE BLD-SCNC: 91 MMOL/L (ref 99–110)
CLARITY: ABNORMAL
CO2: 29 MMOL/L (ref 21–32)
COLOR: YELLOW
CREAT SERPL-MCNC: 0.8 MG/DL (ref 0.6–1.2)
EOSINOPHILS ABSOLUTE: 0 K/UL (ref 0–0.6)
EOSINOPHILS RELATIVE PERCENT: 0.2 %
EPITHELIAL CELLS, UA: 1 /HPF (ref 0–5)
GFR SERPL CREATININE-BSD FRML MDRD: >60 ML/MIN/{1.73_M2}
GLUCOSE BLD-MCNC: 118 MG/DL (ref 70–99)
GLUCOSE BLD-MCNC: 138 MG/DL (ref 70–99)
GLUCOSE BLD-MCNC: 140 MG/DL (ref 70–99)
GLUCOSE BLD-MCNC: 154 MG/DL (ref 70–99)
GLUCOSE URINE: NEGATIVE MG/DL
HCT VFR BLD CALC: 33.8 % (ref 36–48)
HEMOGLOBIN: 11.2 G/DL (ref 12–16)
HYALINE CASTS: 4 /LPF (ref 0–8)
KETONES, URINE: NEGATIVE MG/DL
LEUKOCYTE ESTERASE, URINE: NEGATIVE
LYMPHOCYTES ABSOLUTE: 1.2 K/UL (ref 1–5.1)
LYMPHOCYTES RELATIVE PERCENT: 14.3 %
MAGNESIUM: 2.1 MG/DL (ref 1.8–2.4)
MCH RBC QN AUTO: 29.3 PG (ref 26–34)
MCHC RBC AUTO-ENTMCNC: 33.1 G/DL (ref 31–36)
MCV RBC AUTO: 88.6 FL (ref 80–100)
MICROSCOPIC EXAMINATION: YES
MONOCYTES ABSOLUTE: 0.8 K/UL (ref 0–1.3)
MONOCYTES RELATIVE PERCENT: 9.7 %
NEUTROPHILS ABSOLUTE: 6.5 K/UL (ref 1.7–7.7)
NEUTROPHILS RELATIVE PERCENT: 75.5 %
NITRITE, URINE: NEGATIVE
PDW BLD-RTO: 12.8 % (ref 12.4–15.4)
PERFORMED ON: ABNORMAL
PH UA: 5 (ref 5–8)
PLATELET # BLD: 183 K/UL (ref 135–450)
PMV BLD AUTO: 8.1 FL (ref 5–10.5)
POTASSIUM REFLEX MAGNESIUM: 3.4 MMOL/L (ref 3.5–5.1)
PROTEIN UA: 30 MG/DL
RBC # BLD: 3.81 M/UL (ref 4–5.2)
RBC UA: 1 /HPF (ref 0–4)
SODIUM BLD-SCNC: 133 MMOL/L (ref 136–145)
SPECIFIC GRAVITY UA: 1.01 (ref 1–1.03)
URINE TYPE: ABNORMAL
UROBILINOGEN, URINE: 0.2 E.U./DL
WBC # BLD: 8.7 K/UL (ref 4–11)
WBC UA: 1 /HPF (ref 0–5)

## 2023-01-09 PROCEDURE — 6370000000 HC RX 637 (ALT 250 FOR IP): Performed by: NURSE PRACTITIONER

## 2023-01-09 PROCEDURE — 2580000003 HC RX 258: Performed by: NURSE PRACTITIONER

## 2023-01-09 PROCEDURE — 2700000000 HC OXYGEN THERAPY PER DAY

## 2023-01-09 PROCEDURE — 85025 COMPLETE CBC W/AUTO DIFF WBC: CPT

## 2023-01-09 PROCEDURE — 94150 VITAL CAPACITY TEST: CPT

## 2023-01-09 PROCEDURE — 6360000002 HC RX W HCPCS: Performed by: NURSE PRACTITIONER

## 2023-01-09 PROCEDURE — 80048 BASIC METABOLIC PNL TOTAL CA: CPT

## 2023-01-09 PROCEDURE — 36415 COLL VENOUS BLD VENIPUNCTURE: CPT

## 2023-01-09 PROCEDURE — 6370000000 HC RX 637 (ALT 250 FOR IP): Performed by: INTERNAL MEDICINE

## 2023-01-09 PROCEDURE — 94760 N-INVAS EAR/PLS OXIMETRY 1: CPT

## 2023-01-09 PROCEDURE — 6370000000 HC RX 637 (ALT 250 FOR IP): Performed by: PEDIATRICS

## 2023-01-09 PROCEDURE — 83735 ASSAY OF MAGNESIUM: CPT

## 2023-01-09 RX ORDER — ALBUTEROL SULFATE 90 UG/1
2 AEROSOL, METERED RESPIRATORY (INHALATION) 4 TIMES DAILY PRN
Qty: 54 G | Refills: 1 | Status: SHIPPED | OUTPATIENT
Start: 2023-01-09

## 2023-01-09 RX ORDER — VALSARTAN 80 MG/1
80 TABLET ORAL DAILY
Qty: 30 TABLET | Refills: 0 | Status: SHIPPED | OUTPATIENT
Start: 2023-01-10

## 2023-01-09 RX ADMIN — VALSARTAN 320 MG: 160 TABLET ORAL at 08:35

## 2023-01-09 RX ADMIN — Medication 1 LOZENGE: at 08:34

## 2023-01-09 RX ADMIN — METOPROLOL TARTRATE 100 MG: 50 TABLET ORAL at 08:34

## 2023-01-09 RX ADMIN — SODIUM CHLORIDE, PRESERVATIVE FREE 10 ML: 5 INJECTION INTRAVENOUS at 08:28

## 2023-01-09 RX ADMIN — LEVOTHYROXINE SODIUM 100 MCG: 0.1 TABLET ORAL at 06:45

## 2023-01-09 RX ADMIN — SODIUM CHLORIDE, PRESERVATIVE FREE 10 ML: 5 INJECTION INTRAVENOUS at 00:07

## 2023-01-09 RX ADMIN — ENOXAPARIN SODIUM 40 MG: 100 INJECTION SUBCUTANEOUS at 08:28

## 2023-01-09 RX ADMIN — ACETAMINOPHEN 650 MG: 325 TABLET ORAL at 06:45

## 2023-01-09 RX ADMIN — Medication 10 ML: at 08:35

## 2023-01-09 RX ADMIN — AMLODIPINE BESYLATE 10 MG: 10 TABLET ORAL at 08:36

## 2023-01-09 ASSESSMENT — PAIN SCALES - GENERAL: PAINLEVEL_OUTOF10: 0

## 2023-01-09 NOTE — PLAN OF CARE
Problem: Discharge Planning  Goal: Discharge to home or other facility with appropriate resources  1/9/2023 1520 by Jignesh De La Rosa RN  Outcome: Completed  1/9/2023 1027 by Jignesh De La Rosa RN  Outcome: Progressing  Flowsheets (Taken 1/9/2023 0820)  Discharge to home or other facility with appropriate resources: Identify barriers to discharge with patient and caregiver  1/9/2023 0431 by Farrukh Salamanca RN  Outcome: Progressing     Problem: Safety - Adult  Goal: Free from fall injury  1/9/2023 1520 by Jignesh De La Rosa RN  Outcome: Completed  1/9/2023 1027 by Jignesh De La Rosa RN  Outcome: Progressing  1/9/2023 0431 by Farrukh Salamanca RN  Outcome: Progressing     Problem: ABCDS Injury Assessment  Goal: Absence of physical injury  1/9/2023 1520 by Jignesh De La Rosa RN  Outcome: Completed  1/9/2023 1027 by Jignesh De La Rosa RN  Outcome: Progressing  1/9/2023 0431 by Farrukh Salamanca RN  Outcome: Progressing     Problem: Neurosensory - Adult  Goal: Achieves stable or improved neurological status  1/9/2023 1520 by Jignesh De La Rosa RN  Outcome: Completed  1/9/2023 1027 by Jignesh De La Rosa RN  Outcome: Progressing  1/9/2023 0431 by Farrukh Salamanca RN  Outcome: Progressing  Goal: Absence of seizures  1/9/2023 1520 by Jignesh De La Rosa RN  Outcome: Completed  1/9/2023 1027 by Jignesh De La Rosa RN  Outcome: Progressing  1/9/2023 0431 by Farrukh Salamanca RN  Outcome: Progressing  Goal: Remains free of injury related to seizures activity  1/9/2023 1520 by Jignesh De La Rosa RN  Outcome: Completed  1/9/2023 1027 by Jignesh De La Rosa RN  Outcome: Progressing  1/9/2023 0431 by Farrukh Salamanca RN  Outcome: Progressing  Goal: Achieves maximal functionality and self care  1/9/2023 1520 by Jignesh De La Rosa RN  Outcome: Completed  1/9/2023 1027 by Jignesh De La Rosa RN  Outcome: Progressing  1/9/2023 0431 by Farrukh Salamanca RN  Outcome: Progressing     Problem: Respiratory - Adult  Goal: Achieves optimal ventilation and oxygenation  1/9/2023 1520 by Aleksey Isidro RN  Outcome: Completed  1/9/2023 1027 by Aleksey Isidro RN  Outcome: Progressing  Flowsheets (Taken 1/9/2023 0820)  Achieves optimal ventilation and oxygenation: Assess for changes in respiratory status  1/9/2023 0431 by Rayann Paget, RN  Outcome: Progressing     Problem: Infection - Adult  Goal: Absence of infection at discharge  1/9/2023 1520 by Aleksey Isidro RN  Outcome: Completed  1/9/2023 1027 by Aleksey Isidro RN  Outcome: Progressing  Flowsheets (Taken 1/9/2023 0820)  Absence of infection at discharge:   Assess and monitor for signs and symptoms of infection   Monitor lab/diagnostic results  1/9/2023 0431 by Rayann Paget, RN  Outcome: Progressing  Goal: Absence of infection during hospitalization  1/9/2023 1520 by Aleksey Isidro RN  Outcome: Completed  1/9/2023 1027 by Aleksey Isidro RN  Outcome: Progressing  1/9/2023 0431 by Rayann Paget, RN  Outcome: Progressing  Goal: Absence of fever/infection during anticipated neutropenic period  1/9/2023 1520 by Aleksey Isidro RN  Outcome: Completed  1/9/2023 1027 by Aleksey Isidro RN  Outcome: Progressing  1/9/2023 0431 by Rayann Paget, RN  Outcome: Progressing     Problem: Confusion  Goal: Confusion, delirium, dementia, or psychosis is improved or at baseline  Description: INTERVENTIONS:  1. Assess for possible contributors to thought disturbance, including medications, impaired vision or hearing, underlying metabolic abnormalities, dehydration, psychiatric diagnoses, and notify attending LIP  2. [place] high risk fall precautions, as indicated  3. Provide frequent short contacts to provide reality reorientation, refocusing and direction  4. Decrease environmental stimuli, including noise as appropriate  5. Monitor and intervene to maintain adequate nutrition, hydration, elimination, sleep and activity  6.  If unable to ensure safety without constant attention obtain sitter and review sitter guidelines with assigned personnel  7. Initiate Psychosocial CNS and Spiritual Care consult, as indicated  1/9/2023 1520 by Martha Muller RN  Outcome: Completed  1/9/2023 1027 by Martha Muller RN  Outcome: Progressing  1/9/2023 0431 by Kalpesh Emerson RN  Outcome: Progressing     Problem: Skin/Tissue Integrity  Goal: Absence of new skin breakdown  Description: 1. Monitor for areas of redness and/or skin breakdown  2. Assess vascular access sites hourly  3. Every 4-6 hours minimum:  Change oxygen saturation probe site  4. Every 4-6 hours:  If on nasal continuous positive airway pressure, respiratory therapy assess nares and determine need for appliance change or resting period.   1/9/2023 1520 by Martha Muller RN  Outcome: Completed  1/9/2023 1027 by Martha Muller RN  Outcome: Progressing  1/9/2023 0431 by Kalpesh Emerson RN  Outcome: Progressing

## 2023-01-09 NOTE — CARE COORDINATION
DISCHARGE SUMMARY     DATE OF DISCHARGE: 1/9/23    DISCHARGE DESTINATION: Home with     HOME CARE: Yes      The Plan for Transition of Care is related to the following treatment goals: To build strength at home. The Patient and/or patient representative  Pollo Cho was provided with a choice of provider and agrees   with the discharge plan. [x] Yes [] No    Freedom of choice list was provided with basic dialogue that supports the patient's individualized plan of care/goals, treatment preferences and shares the quality data associated with the providers. [x] Yes [] No     HEMODIALYSIS: No    TRANSPORTATION: Private Car    NEW DME ORDERED: no    COMMENTS: Discharge to home with .   Electronically signed by Ale Mills RN on 1/9/2023 at 2:38 PM

## 2023-01-09 NOTE — CARE COORDINATION
[facility]    DC order noted, all docs needed have been faxed to [facility] for home care services.     Home care to see patient within 24-48 hrs    Sarah Nicholson RN, BSN CTN  [facility] [phone]

## 2023-01-09 NOTE — PROGRESS NOTES
Called [facility] and medications transferred there. Changed pharmacy in computer for future use. Patient's IV removed without complications. Discharged home with . No further needs at this time.

## 2023-01-09 NOTE — CARE COORDINATION
DISCHARGE SUMMARY     DATE OF DISCHARGE: 1/9/23    DISCHARGE DESTINATION: Home with     HOME CARE: No    HEMODIALYSIS: No    TRANSPORTATION: Private Car    NEW DME ORDERED: no    COMMENTS: Discharge to home with .   Electronically signed by Ericka Adams RN on 1/9/2023 at 2:38 PM

## 2023-01-09 NOTE — PROGRESS NOTES
Physician Progress Note      Delaney Brice  CSN #:                  [id number]  :                       1940  ADMIT DATE:       2023 10:39 PM  DISCH DATE:  RESPONDING  PROVIDER #:        Sera Glasgow DO          QUERY TEXT:    Pt admitted with COVID-19 and noted to have fever, tachycardia and   leukocytosis. If possible, please document in progress notes and discharge   summary if you are evaluating and/or treating: The medical record reflects the following:  Risk Factors: Current admission for COVID-19  Clinical Indicators: VS- T 102.9, P 96. ...... WBC   8.8-12.0..... Chani Leaf [id number]  Treatment: IVF    Thank Jessica Azul RN BSN CDS CRCR  [email or web address]  Options provided:  -- Sepsis present on admission due to COVID-19 infection  -- Covid-19 infection without sepsis  -- Other - I will add my own diagnosis  -- Disagree - Not applicable / Not valid  -- Disagree - Clinically unable to determine / Unknown  -- Refer to Clinical Documentation Reviewer    PROVIDER RESPONSE TEXT:    This patient has Covid-19 infection without sepsis. Query created by: Ishmael Lambert on 2023 8:55 AM      QUERY TEXT:    Patient admitted with COVID-19. Noted documentation of acute respiratory   failure in attending pn on 23. In order to support the diagnosis of acute   respiratory failure, please include additional clinical indicators in your   documentation. Or please document if the diagnosis of acute respiratory   failure has been ruled out after further study. The medical record reflects the following:  Risk Factors: Current admission for COVID-19. Clinical Indicators: VS- T 102.9, P 96,  RR 21. ..98-89% RA   100% 2L. Chani Leaf Chani Leaf On   23  92-88% RA   90% 2L,  RR 20. .. Chani Sarah Ann [id number]. ........ Chani Sarah Ann Per attending   pn on 23-Acute hypoxic and hypercarbic respiratory failure, POA. Chani Sarah Ann Chani Sarah Ann Per ED   dictation on 23-The patient was not hypoxic or in respiratory distress   however she ambulated down the hallway to the bathroom and after she ambulated   back to her room we placed her back on the monitor and she was around 86 to   89% on room air. O2 at 2 L per nasal cannula was placed. She was not in   respiratory distress. And her oxygen level мария  Treatment: Oxygen, Steroids, Lasix IV X 1    Acute Respiratory Failure Clinical Indicators per [employer] [employer]-DRG Training Guide and   Quick Reference Guide:  pO2 < 60 mmHg or SpO2 (pulse oximetry) < 91% breathing room air  pCO2 > 50 and pH < 7.35  P/F ratio (pO2 / FIO2) < 300  pO2 decrease or pCO2 increase by 10 mmHg from baseline (if known)  Supplemental oxygen of 40% or more  Presence of respiratory distress, tachypnea, dyspnea, shortness of breath,   wheezing  Unable to speak in complete sentences  Use of accessory muscles to breathe  Extreme anxiety and feeling of impending doom  Tripod position  Confusion/altered mental status/obtunded      Thank You,  Kimmy Ross RN BSN CDS CRCR  [email or web address]  Options provided:  -- Acute Respiratory Failure as evidenced by, Please document evidence.   -- Acute Respiratory Failure ruled out after study  -- Acute Respiratory Failure ruled out after study and Chronic Respiratory   Failure confirmed  -- Other - I will add my own diagnosis  -- Disagree - Not applicable / Not valid  -- Disagree - Clinically unable to determine / Unknown  -- Refer to Clinical Documentation Reviewer    PROVIDER RESPONSE TEXT:    This patient is in acute respiratory failure as evidenced by rr> 20, so2 < 89%    Query created by: Gio Mcknight on 1/9/2023 9:05 AM      Electronically signed by:  Eva Conway DO 1/9/2023 1:53 PM

## 2023-01-09 NOTE — DISCHARGE SUMMARY
Hospital Medicine Discharge Summary    Patient: Mariaa Todd     Gender: female  : 1940   Age: 78 y.o. MRN: [id number]    Code Status: Full Code     Primary Care Provider: Job Ing    Admit Date: 2023   Discharge Date:   2022    Admitting Physician: Cherelle Pineda MD  Discharge Physician: Kirstie Garcia DO     Discharge Diagnoses: Active Hospital Problems    Diagnosis Date Noted    [id number] [U07.1] 2023     Priority: Medium       Hospital Course:   ***  Work up completed, and improved with treatment as below. was discharged today in stable condition. Disposition:  {DISPOSITIONS:[id number]}    Exam:     /62   Pulse 71   Temp 98.2 °F (36.8 °C) (Oral)   Resp 16   Ht 5' (1.524 m)   Wt 140 lb 14 oz (63.9 kg)   SpO2 96%   BMI 27.51 kg/m²     General appearance:  No apparent distress, appears stated age and cooperative. HEENT:  Normal cephalic, atraumatic without obvious deformity. Pupils equal, round, and reactive to light. Extra ocular muscles intact. Conjunctivae/corneas clear. Neck: Supple, with full range of motion. No jugular venous distention. Trachea midline. Respiratory:  Normal respiratory effort. Clear to auscultation, bilaterally without Rales/Wheezes/Rhonchi. Cardiovascular:  Regular rate and rhythm with normal S1/S2 without murmurs, rubs or gallops. Abdomen: Soft, non-tender, non-distended with normal bowel sounds. Musculoskeletal:  No clubbing, cyanosis or edema bilaterally. Full range of motion without deformity. Skin: Skin color, texture, turgor normal.  No rashes or lesions. Neurologic:  Neurovascularly intact without any focal sensory/motor deficits. Cranial nerves: II-XII intact, grossly non-focal.  Psychiatric:  Alert and oriented, thought content appropriate, normal insight    Consults:     IP CONSULT TO HOSPITALIST    Labs:  For convenience and continuity at follow-up the following most recent labs are provided:    Lab Results Component Value Date/Time    WBC 8.7 01/09/2023 05:47 AM    HGB 11.2 01/09/2023 05:47 AM    HCT 33.8 01/09/2023 05:47 AM    MCV 88.6 01/09/2023 05:47 AM     01/09/2023 05:47 AM     01/09/2023 05:47 AM    K 3.4 01/09/2023 05:47 AM    CL 91 01/09/2023 05:47 AM    CO2 29 01/09/2023 05:47 AM    BUN 22 01/09/2023 05:47 AM    CREATININE 0.8 01/09/2023 05:47 AM    CALCIUM 8.5 01/09/2023 05:47 AM    PHOS 2.5 12/02/2013 05:29 AM    ALKPHOS 60 01/06/2023 11:30 PM    ALT 33 01/06/2023 11:30 PM    AST 39 01/06/2023 11:30 PM    BILITOT 0.3 01/06/2023 11:30 PM    LABALBU 3.6 01/06/2023 11:30 PM     Lab Results   Component Value Date    INR 1.26 (H) 11/29/2013    INR 1.01 11/28/2013       Radiology:  XR CHEST (2 VW)   Final Result   Magnified cardiac silhouette. No radiographic evidence of acute pulmonary   abnormality seen. Overall, no significant changes from the prior study are   identified.              Discharge Medications:   Current Discharge Medication List        START taking these medications    Details   valsartan (DIOVAN) 80 MG tablet Take 1 tablet by mouth daily  Qty: 30 tablet, Refills: 0      Benzocaine-Menthol (CEPACOL SORE THROAT) 15-10 MG lozenge Take 1 lozenge by mouth every 2 hours as needed for Sore Throat      albuterol sulfate HFA (VENTOLIN HFA) 108 (90 Base) MCG/ACT inhaler Inhale 2 puffs into the lungs 4 times daily as needed for Wheezing  Qty: 54 g, Refills: 1           Current Discharge Medication List        Current Discharge Medication List        CONTINUE these medications which have NOT CHANGED    Details   rosuvastatin (CRESTOR) 20 MG tablet Take 20 mg by mouth daily      Dulaglutide 0.75 MG/0.5ML SOPN Inject 0.75 mg into the skin once a week      metoprolol (LOPRESSOR) 100 MG tablet Take 100 mg by mouth 2 times daily      Lactobacillus Rhamnosus, GG, (CULTURELLE PO) Take 1 capsule by mouth daily       alendronate (FOSAMAX) 70 MG tablet Take 70 mg by mouth once a week sundays levothyroxine (SYNTHROID) 100 MCG tablet Take 100 mcg by mouth Daily. Current Discharge Medication List        STOP taking these medications       amLODIPine (NORVASC) 10 MG tablet Comments:   Reason for Stopping:         candesartan-hydrochlorothiazide (ATACAND HCT) 32-12.5 MG per tablet Comments:   Reason for Stopping:         Cholecalciferol (VITAMIN D-3) 25 MCG (1000 UT) CAPS Comments:   Reason for Stopping:         simvastatin (ZOCOR) 40 MG tablet Comments:   Reason for Stopping: Follow-up appointments:  one week    Provider Follow-up:    pcp    Condition at Discharge:  Stable    The patient was seen and examined on day of discharge and this discharge summary is in conjunction with any daily progress note from day of discharge. Time Spent on discharge is 45 minutes  in the examination, evaluation, counseling and review of medications and discharge plan. Signed:    Edelmira Pringle DO   1/9/2023      Thank you Leandro Albright for the opportunity to be involved in this patient's care. If you have any questions or concerns please feel free to contact me at [phone].

## 2023-01-09 NOTE — CARE COORDINATION
INITIAL CASE MANAGEMENT ASSESSMENT    Reviewed chart, met with patient to assess possible discharge needs. Explained Case Management role/services. Living Situation: Patient lives with her  Estebanriccardo Chan in a Condo with 16 steps to enter. ADLs: Independent     DME: None    PT/OT Recs: None     Active Services: None     Transportation: Non-/ transports     Medications: [employer] in [place]/No barriers    PCP:  Jazz Diamond      HD/PD: N/A    PLAN/COMMENTS: Plan: patient plans to return to home with her . SW/CM provided contact information for patient or family to call with any questions. SW/CM will follow and assist as needed.    Electronically signed by Yeny Jeffrey RN on 1/9/2023 at 12:13 PM

## 2023-01-09 NOTE — PLAN OF CARE
Problem: Discharge Planning  Goal: Discharge to home or other facility with appropriate resources  1/9/2023 1027 by Sandra Jean RN  Outcome: Progressing  Flowsheets (Taken 1/9/2023 0820)  Discharge to home or other facility with appropriate resources: Identify barriers to discharge with patient and caregiver  1/9/2023 0431 by Will Eid RN  Outcome: Progressing     Problem: Safety - Adult  Goal: Free from fall injury  1/9/2023 1027 by Sandra Jean RN  Outcome: Progressing  1/9/2023 0431 by Will Eid RN  Outcome: Progressing     Problem: ABCDS Injury Assessment  Goal: Absence of physical injury  1/9/2023 1027 by Sandra Jean RN  Outcome: Progressing  1/9/2023 0431 by Will Eid RN  Outcome: Progressing     Problem: Neurosensory - Adult  Goal: Achieves stable or improved neurological status  1/9/2023 1027 by Sandra Jean RN  Outcome: Progressing  1/9/2023 0431 by Will Eid RN  Outcome: Progressing  Goal: Absence of seizures  1/9/2023 1027 by Sandra Jean RN  Outcome: Progressing  1/9/2023 0431 by Will Eid RN  Outcome: Progressing  Goal: Remains free of injury related to seizures activity  1/9/2023 1027 by Sandra Jean RN  Outcome: Progressing  1/9/2023 0431 by Will Eid RN  Outcome: Progressing  Goal: Achieves maximal functionality and self care  1/9/2023 1027 by Sandra Jean RN  Outcome: Progressing  1/9/2023 0431 by Will Eid RN  Outcome: Progressing     Problem: Respiratory - Adult  Goal: Achieves optimal ventilation and oxygenation  1/9/2023 1027 by Sandra Jean RN  Outcome: Progressing  Flowsheets (Taken 1/9/2023 0820)  Achieves optimal ventilation and oxygenation: Assess for changes in respiratory status  1/9/2023 0431 by Will Eid RN  Outcome: Progressing     Problem: Infection - Adult  Goal: Absence of infection at discharge  1/9/2023 1027 by Sandra Jean RN  Outcome: Progressing  Flowsheets (Taken 1/9/2023 0820)  Absence of infection at discharge:   Assess and monitor for signs and symptoms of infection   Monitor lab/diagnostic results  1/9/2023 0431 by Linnette Renae RN  Outcome: Progressing  Goal: Absence of infection during hospitalization  1/9/2023 1027 by Irina Donald RN  Outcome: Progressing  1/9/2023 0431 by Linnette Renae RN  Outcome: Progressing  Goal: Absence of fever/infection during anticipated neutropenic period  1/9/2023 1027 by Irina Donald RN  Outcome: Progressing  1/9/2023 0431 by Linnette Renae RN  Outcome: Progressing     Problem: Confusion  Goal: Confusion, delirium, dementia, or psychosis is improved or at baseline  Description: INTERVENTIONS:  1. Assess for possible contributors to thought disturbance, including medications, impaired vision or hearing, underlying metabolic abnormalities, dehydration, psychiatric diagnoses, and notify attending LIP  2. [place] high risk fall precautions, as indicated  3. Provide frequent short contacts to provide reality reorientation, refocusing and direction  4. Decrease environmental stimuli, including noise as appropriate  5. Monitor and intervene to maintain adequate nutrition, hydration, elimination, sleep and activity  6. If unable to ensure safety without constant attention obtain sitter and review sitter guidelines with assigned personnel  7. Initiate Psychosocial CNS and Spiritual Care consult, as indicated  1/9/2023 1027 by Irina Donald RN  Outcome: Progressing  1/9/2023 0431 by Linnette Renae RN  Outcome: Progressing     Problem: Skin/Tissue Integrity  Goal: Absence of new skin breakdown  Description: 1. Monitor for areas of redness and/or skin breakdown  2. Assess vascular access sites hourly  3. Every 4-6 hours minimum:  Change oxygen saturation probe site  4.   Every 4-6 hours:  If on nasal continuous positive airway pressure, respiratory therapy assess nares and determine need for appliance change or resting period.   1/9/2023 1027 by Marina Garcia, RN  Outcome: Progressing  1/9/2023 0431 by Eva Garcia RN  Outcome: Progressing

## 2023-01-09 NOTE — PROGRESS NOTES
Hospitalist Progress Note    CC: [id number]      Admit date: 1/6/2023  Days in hospital:  2    Subjective/interval history: Pt S/E. No acute events. Pt now on o2. O2 status: 2L    ROS:   Pertinent items are noted in HPI. Objective:    /62   Pulse 71   Temp 98.2 °F (36.8 °C) (Oral)   Resp 16   Ht 5' (1.524 m)   Wt 140 lb 14 oz (63.9 kg)   SpO2 95%   BMI 27.51 kg/m²     Gen: alert, NAD  HEENT: NC/AT, moist mucous membranes, no oropharyngeal erythema or exudate  Neck: supple, trachea midline, no anterior cervical or SC LAD  Heart: Normal s1/s2, RRR, no murmurs, gallops, or rubs. Lungs: clear bilaterally, no wheezing, no rales, no rhonchi, no use of accessory muscles  Abd: bowel sounds present, soft, nontender, nondistended, no masses  Extrem: No clubbing, cyanosis, no edema  Skin: no rashes or lesions  Psych: A & O x3, affect appropriate  Neuro: grossly intact, moves all four extremities spontaneously.   Cap refill: +2 sec    Medications:  Scheduled Meds:   insulin lispro  0-16 Units SubCUTAneous TID WC    insulin lispro  0-4 Units SubCUTAneous Nightly    amLODIPine  10 mg Oral Daily    levothyroxine  100 mcg Oral Daily    metoprolol  100 mg Oral BID    sodium chloride flush  5-40 mL IntraVENous 2 times per day    enoxaparin  40 mg SubCUTAneous Daily    valsartan  320 mg Oral Daily    rosuvastatin  20 mg Oral Nightly       PRN Meds:  glucose, dextrose bolus **OR** dextrose bolus, glucagon (rDNA), dextrose, sodium chloride flush, sodium chloride, ondansetron **OR** ondansetron, polyethylene glycol, acetaminophen **OR** acetaminophen, guaiFENesin-dextromethorphan, Benzocaine-Menthol    IV:   dextrose      sodium chloride         No intake or output data in the 24 hours ending 01/09/23 0823    Results:  CBC:   Recent Labs     01/06/23  2330 01/08/23  0902 01/09/23  0547   WBC 8.8 12.0* 8.7   HGB 12.2 11.2* 11.2*   HCT 37.5 34.4* 33.8*   MCV 90.1 88.8 88.6    192 183     BMP: Recent Labs     01/07/23  0614 01/08/23  0902 01/09/23  0547    129* 133*   K 3.4* 3.7 3.4*   CL 98* 90* 91*   CO2 27 26 29   BUN 16 13 22*   CREATININE 0.6 0.6 0.8     Mag: No results for input(s): MAG in the last 72 hours. Phos:   Lab Results   Component Value Date    PHOS 2.5 12/02/2013     No results found for: GLU    LIVER PROFILE:   Recent Labs     01/06/23  2330   AST 39*   ALT 33   LIPASE 20.0   BILITOT 0.3   ALKPHOS 60     PT/INR: No results for input(s): PROTIME, INR in the last 72 hours. APTT: No results for input(s): APTT in the last 72 hours. UA:  Recent Labs     01/08/23  1224   COLORU Yellow   PHUR 5.0   WBCUA 1   RBCUA 1   BACTERIA None Seen   CLARITYU CLOUDY*   SPECGRAV 1.013   LEUKOCYTESUR Negative   UROBILINOGEN 0.2   BILIRUBINUR Negative   BLOODU Negative   GLUCOSEU Negative       Invalid input(s): ABG  Lab Results   Component Value Date    CALCIUM 8.5 01/09/2023    PHOS 2.5 12/02/2013       Assessment:    Principal Problem:    COVID-19  Resolved Problems:    * No resolved hospital problems. [facility] course: An 77 yo female admitted with covid.     Plan:  Acute hypoxic and hypercarbic respiratory failure, POA  - so2 < 90% on ra, rr> 18  - supplemental o2 to maintain so2 > 90%  Covid +  - steroids  - lasix x1 today      Chronic conditions - continue home meds unless otherwise stated    Code status:  full  DVT prophylaxis: [] Lovenox  [] SQ Heparin  [] SCDs  [] warfarin/oral direct thrombin inhibitor [] Encourage ambulation      Disposition:  [] Home [] Rehab [] Psych [] SNF  [] LTAC  [] Transfer to ICU  [] Transfer to PCU [] Other: in pt, dc when off o2      Electronically signed by Rekha Deutsch DO on 1/9/2023 at 8:23 AM

## 2023-01-09 NOTE — DISCHARGE INSTR - DIET

## 2023-01-09 NOTE — ACP (ADVANCE CARE PLANNING)
Advance Care Planning     Advance Care Planning Activator (Inpatient)  Conversation Note      Date of ACP Conversation: 1/9/2023     Conversation Conducted with: Patient with Decision Making Capacity    ACP Activator: Kp Tucker RN    Health Care Decision Maker:     Current Designated Health Care Decision Maker:     Primary Decision Maker: [address] - Spouse - [phone]    Secondary Decision Maker: Cole Conde Child - [phone]    Care Preferences    Ventilation: \"If you were in your present state of health and suddenly became very ill and were unable to breathe on your own, what would your preference be about the use of a ventilator (breathing machine) if it were available to you? \"      Would the patient desire the use of ventilator (breathing machine)?: yes    \"If your health worsens and it becomes clear that your chance of recovery is unlikely, what would your preference be about the use of a ventilator (breathing machine) if it were available to you? \"     Would the patient desire the use of ventilator (breathing machine)?: No      Resuscitation  \"CPR works best to restart the heart when there is a sudden event, like a heart attack, in someone who is otherwise healthy. Unfortunately, CPR does not typically restart the heart for people who have serious health conditions or who are very sick. \"    \"In the event your heart stopped as a result of an underlying serious health condition, would you want attempts to be made to restart your heart (answer \"yes\" for attempt to resuscitate) or would you prefer a natural death (answer \"no\" for do not attempt to resuscitate)? \" no       [] Yes   [x] No   Educated Patient / Catie Breen regarding differences between Advance Directives and portable DNR orders.     Length of ACP Conversation in minutes:  5    Conversation Outcomes:  [x] ACP discussion completed  [] Existing advance directive reviewed with patient; no changes to patient's previously recorded wishes  [] New Advance Directive completed  [] Portable Do Not Rescitate prepared for Provider review and signature  [] POLST/POST/MOLST/MOST prepared for Provider review and signature      Follow-up plan:    [] Schedule follow-up conversation to continue planning  [] Referred individual to Provider for additional questions/concerns   [] Advised patient/agent/surrogate to review completed ACP document and update if needed with changes in condition, patient preferences or care setting    [x] This note routed to one or more involved healthcare providers

## 2023-01-09 NOTE — CARE COORDINATION
[facility]    Referral received from  to follow for home care services. I will follow for needs, and speak with patient to verify demos.     Silke Butler RN, BSN CTN  Count includes the [facility] [phone]

## 2023-01-09 NOTE — DISCHARGE INSTR - COC
Continuity of Care Form    Patient Name: Mariaa Todd   :  1940  MRN:  [id number]    Admit date:  2023  Discharge date:  2023    Code Status Order: Full Code   Advance Directives:     Admitting Physician:  Cherelle Pineda MD  PCP: Job Ing    Discharging Nurse: [facility] Unit/Room#: [id number]/8901-10  Discharging Unit Phone Number: [phone]    Emergency Contact:   Extended Emergency Contact Information  Primary Emergency Contact: Samir Mcneil  Address: [address] [facility], [address] [place] [address]  Home Phone: [phone]  Mobile Phone: [phone]  Relation: Spouse  Secondary Emergency Contact: MARTI Kennedy [address] Phone: [phone]  Relation: Child    Past Surgical History:  Past Surgical History:   Procedure Laterality Date    COLONOSCOPY N/A 2022    COLONOSCOPY performed by Baljeet Garcia MD at [address] Right     right leg    INGUINAL HERNIA REPAIR Left 12    Left incarcerated inguinal hernia repair with mesh and exparel     LEG SURGERY Right     alexis placement    SHOULDER SURGERY Left     rotator cuff repair       Immunization History:   Immunization History   Administered Date(s) Administered    COVID-19, PFIZER PURPLE top, DILUTE for use, (age 15 y+), 30mcg/0.3mL 2021, 2021       Active Problems:  Patient Active Problem List   Diagnosis Code    Incarcerated inguinal hernia K40.30    Diverticulitis K69.66    Complicated UTI (urinary tract infection) N39.0    COVID-19 U07.1       Isolation/Infection:   Isolation            Droplet Plus          Patient Infection Status       Infection Onset Added Last Indicated Last Indicated By Review Planned Expiration Resolved Resolved By    COVID-19 23 COVID-19, Rapid 23      Resolved    COVID-19 (Rule Out) 23 COVID-19, Rapid (Ordered)   23 Rule-Out Test Resulted    COVID-19 (Rule Out) 11/14/20 11/14/20 11/14/20 COVID-19 (Ordered)   11/15/20 Carlos Mckeon RN    COVID-19 (Rule Out) 11/13/20 11/13/20 11/14/20 COVID-19 (Ordered)   11/13/20 Rule-Out Test Resulted            Nurse Assessment:  Last Vital Signs: /62   Pulse 71   Temp 98.2 °F (36.8 °C) (Oral)   Resp 16   Ht 5' (1.524 m)   Wt 140 lb 14 oz (63.9 kg)   SpO2 96%   BMI 27.51 kg/m²     Last documented pain score (0-10 scale): Pain Level: 0  Last Weight:   Wt Readings from Last 1 Encounters:   01/09/23 140 lb 14 oz (63.9 kg)     Mental Status:  oriented    IV Access:  - None    Nursing Mobility/ADLs:  Walking   Independent  Transfer  Independent  Bathing  Independent  Dressing  Independent  Toileting  Independent  Feeding  [address]  Independent  Med Delivery   whole    Wound Care Documentation and Therapy:        Elimination:  Continence: Bowel: Yes  Bladder: Yes  Urinary Catheter: None   Colostomy/Ileostomy/Ileal Conduit: No       Date of Last BM: unknown    Intake/Output Summary (Last 24 hours) at 1/9/2023 1440  Last data filed at 1/9/2023 1139  Gross per 24 hour   Intake 480 ml   Output --   Net 480 ml     No intake/output data recorded. Safety Concerns:     [facility] Safety Concerns:[id number]}    Impairments/Disabilities:      Vision (glasses)    Nutrition Therapy:  Current Nutrition Therapy:   - Oral Diet:  General    Routes of Feeding: Oral  Liquids: No Restrictions  Daily Fluid Restriction: no  Last Modified Barium Swallow with Video (Video Swallowing Test): not done    Treatments at the Time of Hospital Discharge:   Respiratory Treatments: oxygen  Oxygen Therapy:  is not on home oxygen therapy.   Ventilator:    - No ventilator support    Rehab Therapies: Physical Therapy and Occupational Therapy  Weight Bearing Status/Restrictions: No weight bearing restrictions  Other Medical Equipment (for information only, NOT a DME order):  {EQUIPMENT:[id number]}  Other Treatments: HOME HEALTH CARE: LEVEL 3 SAFETY     Home health agency to establish plan of care for patient over 60 day period   Nursing  Initial home SN evaluation visit to occur within 24-48 hours for:  1)  medication management  2)  VS and clinical assessment  3)  S&S chronic disease exacerbation education + when to contact MD/NP  4)  care coordination  Medication Reconciliation during 1st SN visit  PT/OT/Speech   Evaluations in home within 24-48 hours of discharge to include DME and home safety   Frontload therapy 5 days, then 3x a week   OT to evaluate if patient has [address] needs for personal care    evaluation within 24-48 hours to evaluate resources & insurance for potential AL, [place], LTC, and Medicaid options   Palliative Care referral within 5 days of hospital discharge   PCP Visit scheduled within 3 - 7 days of hospital discharge    [facility] Vitals (If patient is agreeable and meets guidelines)      Patient's personal belongings (please select all that are sent with patient):  Mk    RN SIGNATURE:  Electronically signed by Adalberto Morales RN on 1/9/23 at 3:44 PM EST    CASE MANAGEMENT/SOCIAL WORK SECTION    Inpatient Status Date: 1/7/23    Readmission Risk Assessment Score:  Readmission Risk              Risk of Unplanned Readmission:  11           Discharging to Facility/ Agency   Name: Discharging to Facility/ Agency   Name:  [facility] care    Address: [address] [place] [address], [place] [address]  Phone: [phone]  Fax: [phone]      Dialysis Facility (if applicable)   Name:  Address:  Dialysis Schedule:  Phone:  Fax:    / signature: Electronically signed by Bro Kelly RN on 1/9/23 at 3:58 PM EST    PHYSICIAN SECTION    Prognosis: Fair    Condition at Discharge: Stable    Rehab Potential (if transferring to Rehab):  Fair    Recommended Labs or Other Treatments After Discharge: pt, ot, nurse    Physician Certification: I certify the above information and transfer of Eliza Ovalle  is necessary for the continuing treatment of the diagnosis listed and that she requires Home Care for greater 30 days.      Update Admission H&P: No change in H&P    PHYSICIAN SIGNATURE:  Electronically signed by Angelina Davis DO on 1/9/23 at 3:06 PM EST

## 2023-05-04 ENCOUNTER — HOSPITAL ENCOUNTER (OUTPATIENT)
Dept: WOMENS IMAGING | Age: 83
Discharge: HOME | End: 2023-05-04
Payer: MEDICARE

## 2023-05-04 DIAGNOSIS — Z12.31 VISIT FOR SCREENING MAMMOGRAM: ICD-10-CM

## 2023-05-04 PROCEDURE — 77067 SCR MAMMO BI INCL CAD: CPT

## 2025-02-06 NOTE — PROGRESS NOTES
PT AAO x4 per this shift. Pt calling out appropriately. Pt denies pain or nausea per this shift VSS. All assessments completed at this time. No futher needs voiced. Fall precautions in place. Bed alarm on. Call light within reach. Will continue to round.  Electronically signed by Flower Barclay RN on 11/17/2020 at 10:54 PM No

## (undated) DEVICE — ENDOSCOPY KIT: Brand: MEDLINE INDUSTRIES, INC.